# Patient Record
Sex: MALE | Race: WHITE | NOT HISPANIC OR LATINO | Employment: STUDENT | URBAN - METROPOLITAN AREA
[De-identification: names, ages, dates, MRNs, and addresses within clinical notes are randomized per-mention and may not be internally consistent; named-entity substitution may affect disease eponyms.]

---

## 2019-02-07 ENCOUNTER — APPOINTMENT (OUTPATIENT)
Dept: RADIOLOGY | Facility: IMAGING CENTER | Age: 29
End: 2019-02-07
Attending: PHYSICIAN ASSISTANT
Payer: COMMERCIAL

## 2019-02-07 ENCOUNTER — OFFICE VISIT (OUTPATIENT)
Dept: URGENT CARE | Facility: CLINIC | Age: 29
End: 2019-02-07
Payer: COMMERCIAL

## 2019-02-07 VITALS
WEIGHT: 222.4 LBS | HEIGHT: 76 IN | TEMPERATURE: 98 F | SYSTOLIC BLOOD PRESSURE: 112 MMHG | BODY MASS INDEX: 27.08 KG/M2 | DIASTOLIC BLOOD PRESSURE: 78 MMHG | HEART RATE: 96 BPM | RESPIRATION RATE: 16 BRPM | OXYGEN SATURATION: 97 %

## 2019-02-07 DIAGNOSIS — R07.89 CHEST WALL PAIN: ICD-10-CM

## 2019-02-07 DIAGNOSIS — J10.1 INFLUENZA A: ICD-10-CM

## 2019-02-07 DIAGNOSIS — Z87.01 HISTORY OF RECURRENT PNEUMONIA: ICD-10-CM

## 2019-02-07 DIAGNOSIS — R05.9 COUGH: ICD-10-CM

## 2019-02-07 LAB
FLUAV+FLUBV AG SPEC QL IA: POSITIVE
INT CON NEG: NORMAL
INT CON POS: NORMAL

## 2019-02-07 PROCEDURE — 71046 X-RAY EXAM CHEST 2 VIEWS: CPT | Mod: TC,FY | Performed by: PHYSICIAN ASSISTANT

## 2019-02-07 PROCEDURE — 87804 INFLUENZA ASSAY W/OPTIC: CPT | Performed by: PHYSICIAN ASSISTANT

## 2019-02-07 PROCEDURE — 99214 OFFICE O/P EST MOD 30 MIN: CPT | Performed by: PHYSICIAN ASSISTANT

## 2019-02-07 RX ORDER — BENZONATATE 100 MG/1
200 CAPSULE ORAL 3 TIMES DAILY PRN
Qty: 60 CAP | Refills: 0 | Status: SHIPPED | OUTPATIENT
Start: 2019-02-07 | End: 2020-09-14

## 2019-02-07 RX ORDER — OSELTAMIVIR PHOSPHATE 75 MG/1
75 CAPSULE ORAL 2 TIMES DAILY
Qty: 10 CAP | Refills: 0 | Status: SHIPPED | OUTPATIENT
Start: 2019-02-07 | End: 2019-02-12

## 2019-02-07 RX ORDER — DEXTROMETHORPHAN HYDROBROMIDE AND PROMETHAZINE HYDROCHLORIDE 15; 6.25 MG/5ML; MG/5ML
5 SYRUP ORAL 4 TIMES DAILY PRN
Qty: 100 ML | Refills: 0 | Status: SHIPPED | OUTPATIENT
Start: 2019-02-07 | End: 2019-02-12

## 2019-02-07 ASSESSMENT — ENCOUNTER SYMPTOMS
WHEEZING: 0
PALPITATIONS: 0
COUGH: 1
MYALGIAS: 1
CHILLS: 1
HEMOPTYSIS: 0
VOMITING: 0
SPUTUM PRODUCTION: 1
NAUSEA: 1
ABDOMINAL PAIN: 0
HEADACHES: 1
FEVER: 1
DIARRHEA: 1
SHORTNESS OF BREATH: 1

## 2019-02-07 NOTE — PROGRESS NOTES
Subjective:   Pantera Connelly is a 28 y.o. male who presents for Cough (wet, productive cough, chest congestion x 1 day)    This is a new problem.  Patient presents to urgent care with concern for possible pneumonia.  Yesterday he began to experience generalized body aches, perceived fever, chills, congestion, sore throat and cough.  Today, his symptoms have worsened and his cough has worsened as well.  Cough is productive of thick yellow-green mucous plugs.  He reports pain in the anterior chest with deep breathing and coughing.  He denies any palpitations or peripheral edema.  The patient is concerned about the possibility of pneumonia due to the fact that in the past 10 years he has had 4 documented cases of pneumonia.  The patient initially states that he does not have a history of asthma however after further thought he states that he was diagnosed with asthma at some point in his life but then apparently this diagnosis was rescinded.  Patient did not receive influenza vaccine this season.        Cough   Associated symptoms include chills, a fever, headaches, myalgias and shortness of breath. Pertinent negatives include no ear pain, hemoptysis, rash or wheezing.     Review of Systems   Constitutional: Positive for chills, fever and malaise/fatigue.   HENT: Positive for congestion. Negative for ear pain.    Respiratory: Positive for cough, sputum production and shortness of breath. Negative for hemoptysis and wheezing.    Cardiovascular: Negative for palpitations and leg swelling.        Chest wall pain   Gastrointestinal: Positive for diarrhea and nausea. Negative for abdominal pain and vomiting.   Musculoskeletal: Positive for myalgias.   Skin: Negative for rash.   Neurological: Positive for headaches.   All other systems reviewed and are negative.    Allergies   Allergen Reactions   • Other Drug      'ANTIBIOTIC,NAME UNKNOWN,CAUSES RASH'        Objective:   /78   Pulse 96   Temp 36.7 °C (98 °F)  "(Temporal)   Resp 16   Ht 1.93 m (6' 4\")   Wt 100.9 kg (222 lb 6.4 oz)   SpO2 97%   BMI 27.07 kg/m²   Physical Exam   Constitutional: He is oriented to person, place, and time. He appears well-developed and well-nourished.  Non-toxic appearance. He appears ill.   HENT:   Head: Normocephalic and atraumatic.   Right Ear: Tympanic membrane, external ear and ear canal normal.   Left Ear: Tympanic membrane, external ear and ear canal normal.   Nose: Mucosal edema present. No rhinorrhea. Right sinus exhibits no frontal sinus tenderness. Left sinus exhibits no maxillary sinus tenderness and no frontal sinus tenderness.   Mouth/Throat: Uvula is midline and mucous membranes are normal. No uvula swelling. Posterior oropharyngeal erythema present. No oropharyngeal exudate, posterior oropharyngeal edema or tonsillar abscesses. Tonsils are 2+ on the right. Tonsils are 2+ on the left. No tonsillar exudate.       Soft palate left side with small ulceration   Eyes: Pupils are equal, round, and reactive to light. Conjunctivae and EOM are normal.   Neck: Normal range of motion. Neck supple.   Cardiovascular: Normal rate, regular rhythm and normal heart sounds.  Exam reveals no friction rub.    No murmur heard.  Pulmonary/Chest: Effort normal. No respiratory distress. He has decreased breath sounds in the right middle field and the right lower field. He has no wheezes. He has rhonchi in the right middle field and the right lower field. He has no rales.   Abdominal: Soft. Bowel sounds are normal. There is no hepatosplenomegaly. There is no tenderness.   Musculoskeletal: Normal range of motion.   Lymphadenopathy:        Head (right side): No submental, no submandibular and no tonsillar adenopathy present.        Head (left side): No submental, no submandibular and no tonsillar adenopathy present.     He has no cervical adenopathy.        Right: No supraclavicular adenopathy present.        Left: No supraclavicular adenopathy " present.   Neurological: He is alert and oriented to person, place, and time. He has normal strength. No cranial nerve deficit or sensory deficit. Coordination normal.   Skin: Skin is warm and dry. No rash noted.   Psychiatric: He has a normal mood and affect. Judgment normal.           Assessment/Plan:   Assessment    1. Influenza A  - POCT Influenza A/B: + influenza A  - oseltamivir (TAMIFLU) 75 MG Cap; Take 1 Cap by mouth 2 times a day for 5 days.  Dispense: 10 Cap; Refill: 0      Patient is positive for influenza A.  He will be treated with Tamiflu.  He is encouraged to take this with food in order to avoid nausea.  For the cough he is given Tessalon Perles and promethazine DM for nighttime cough.  He is counseled on not driving while taking this medication.  Symptomatic, supportive care.  Increase fluids, rest.  Contagion reviewed.  Printed information with patient education on influenza given.  Recommend influenza vaccine in the future.    Given his past history of pneumonia and his concern chest x-ray is obtained.  Chest x-ray read by the radiologist and reviewed by myself is without evidence of pneumonia.  However, given his recurrent episodes of pneumonia the patient is requesting referral to pulmonology.  Referral was placed.    Red flag warning symptoms and strict ER precautions given.    2. Cough  - DX-CHEST-2 VIEWS; Future  - benzonatate (TESSALON) 100 MG Cap; Take 2 Caps by mouth 3 times a day as needed.  Dispense: 60 Cap; Refill: 0  - promethazine-dextromethorphan (PROMETHAZINE-DM) 6.25-15 MG/5ML syrup; Take 5 mL by mouth 4 times a day as needed for Cough for up to 5 days.  Dispense: 100 mL; Refill: 0    3. Chest wall pain  - DX-CHEST-2 VIEWS; Future    4. History of recurrent pneumonia  - REFERRAL TO PULMONOLOGY    Differential diagnosis, natural history, supportive care, and indications for immediate follow-up discussed.    If not improving in 3-5 days, F/U with PCP or return to  or sooner if  worsens    Please note that this note was created using voice recognition speech to text software. Every effort has been made to correct obvious errors.  However, I expect there are errors of grammar and possibly context that were not discovered prior to finalizing the note

## 2019-02-07 NOTE — LETTER
February 7, 2019         Patient: Pantera Connelly   YOB: 1990   Date of Visit: 2/7/2019           To Whom it May Concern:    Pantera Connelly was seen in my clinic on 2/7/2019. He may return to work on 2/11/19..    If you have any questions or concerns, please don't hesitate to call.        Sincerely,           Lizzie Schuler P.A.-C.  Electronically Signed

## 2020-09-14 ENCOUNTER — HOSPITAL ENCOUNTER (OUTPATIENT)
Facility: MEDICAL CENTER | Age: 30
End: 2020-09-14
Attending: PHYSICIAN ASSISTANT
Payer: COMMERCIAL

## 2020-09-14 ENCOUNTER — OFFICE VISIT (OUTPATIENT)
Dept: URGENT CARE | Facility: CLINIC | Age: 30
End: 2020-09-14
Payer: COMMERCIAL

## 2020-09-14 ENCOUNTER — APPOINTMENT (OUTPATIENT)
Dept: RADIOLOGY | Facility: IMAGING CENTER | Age: 30
End: 2020-09-14
Attending: PHYSICIAN ASSISTANT
Payer: COMMERCIAL

## 2020-09-14 VITALS
SYSTOLIC BLOOD PRESSURE: 118 MMHG | WEIGHT: 235 LBS | TEMPERATURE: 98.2 F | BODY MASS INDEX: 28.62 KG/M2 | HEART RATE: 77 BPM | OXYGEN SATURATION: 100 % | HEIGHT: 76 IN | DIASTOLIC BLOOD PRESSURE: 80 MMHG | RESPIRATION RATE: 20 BRPM

## 2020-09-14 DIAGNOSIS — R06.02 SOB (SHORTNESS OF BREATH): ICD-10-CM

## 2020-09-14 DIAGNOSIS — J45.21 MILD INTERMITTENT ASTHMA WITH ACUTE EXACERBATION: Primary | ICD-10-CM

## 2020-09-14 DIAGNOSIS — Z20.822 CLOSE EXPOSURE TO COVID-19 VIRUS: ICD-10-CM

## 2020-09-14 LAB — COVID ORDER STATUS COVID19: NORMAL

## 2020-09-14 PROCEDURE — 99214 OFFICE O/P EST MOD 30 MIN: CPT | Mod: CR,CS | Performed by: PHYSICIAN ASSISTANT

## 2020-09-14 PROCEDURE — 71046 X-RAY EXAM CHEST 2 VIEWS: CPT | Mod: TC | Performed by: PHYSICIAN ASSISTANT

## 2020-09-14 PROCEDURE — U0003 INFECTIOUS AGENT DETECTION BY NUCLEIC ACID (DNA OR RNA); SEVERE ACUTE RESPIRATORY SYNDROME CORONAVIRUS 2 (SARS-COV-2) (CORONAVIRUS DISEASE [COVID-19]), AMPLIFIED PROBE TECHNIQUE, MAKING USE OF HIGH THROUGHPUT TECHNOLOGIES AS DESCRIBED BY CMS-2020-01-R: HCPCS

## 2020-09-14 RX ORDER — ALBUTEROL SULFATE 90 UG/1
2 AEROSOL, METERED RESPIRATORY (INHALATION) EVERY 6 HOURS PRN
Qty: 8.5 G | Refills: 0 | Status: SHIPPED | OUTPATIENT
Start: 2020-09-14 | End: 2023-03-23

## 2020-09-14 ASSESSMENT — ENCOUNTER SYMPTOMS
SHORTNESS OF BREATH: 1
ABDOMINAL PAIN: 0
FEVER: 0
PALPITATIONS: 0
SPUTUM PRODUCTION: 0
SORE THROAT: 0
WHEEZING: 1
COUGH: 1
CONSTIPATION: 0
DIARRHEA: 0
CHILLS: 0
MYALGIAS: 0
VOMITING: 0
NAUSEA: 0
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
## 2020-09-14 NOTE — PATIENT INSTRUCTIONS
COVID-19 results pending    *Return home and continue self-isolation until you get your test result back.  If positive: You will be called by [facility] staff.    · Stay home and continue self isolation until:  · At least ten (10) days have passed since symptoms first appeared AND  · At least 24 hours have passed from last fever without the use of fever-reducing medications AND  · Symptoms have improved (eg: cough or shortness of breath)    If negative: You will be getting a [employer] message or a letter from [facility].  · Stay home until you have no fever for 24 hours and your symptoms (cough and shortness of breath) have resolved.    You may call [facility] ER Discharge Culture Line at [phone] for results/questions.    *Even after the above are met, maintain social distance from others (at least 6 feet) and practice frequent hand washing.    *Wear a face mask in public places.

## 2020-09-15 LAB
SARS-COV-2 RNA RESP QL NAA+PROBE: NOTDETECTED
SPECIMEN SOURCE: NORMAL

## 2020-12-04 ENCOUNTER — HOSPITAL ENCOUNTER (OUTPATIENT)
Facility: MEDICAL CENTER | Age: 30
End: 2020-12-04
Attending: NURSE PRACTITIONER
Payer: COMMERCIAL

## 2020-12-04 ENCOUNTER — OFFICE VISIT (OUTPATIENT)
Dept: URGENT CARE | Facility: PHYSICIAN GROUP | Age: 30
End: 2020-12-04
Payer: COMMERCIAL

## 2020-12-04 VITALS
DIASTOLIC BLOOD PRESSURE: 68 MMHG | OXYGEN SATURATION: 95 % | SYSTOLIC BLOOD PRESSURE: 130 MMHG | TEMPERATURE: 98.2 F | HEART RATE: 78 BPM | BODY MASS INDEX: 29.22 KG/M2 | RESPIRATION RATE: 16 BRPM | HEIGHT: 76 IN | WEIGHT: 240 LBS

## 2020-12-04 DIAGNOSIS — J45.21 MILD INTERMITTENT ASTHMA WITH EXACERBATION: ICD-10-CM

## 2020-12-04 DIAGNOSIS — Z20.822 SUSPECTED COVID-19 VIRUS INFECTION: ICD-10-CM

## 2020-12-04 DIAGNOSIS — Z00.00 HEALTH CARE MAINTENANCE: ICD-10-CM

## 2020-12-04 DIAGNOSIS — Z23 FLU VACCINE NEED: ICD-10-CM

## 2020-12-04 DIAGNOSIS — R05.9 COUGH: ICD-10-CM

## 2020-12-04 PROCEDURE — 99214 OFFICE O/P EST MOD 30 MIN: CPT | Mod: 25,CS | Performed by: NURSE PRACTITIONER

## 2020-12-04 PROCEDURE — 90471 IMMUNIZATION ADMIN: CPT | Performed by: NURSE PRACTITIONER

## 2020-12-04 PROCEDURE — 90686 IIV4 VACC NO PRSV 0.5 ML IM: CPT | Performed by: NURSE PRACTITIONER

## 2020-12-04 PROCEDURE — U0003 INFECTIOUS AGENT DETECTION BY NUCLEIC ACID (DNA OR RNA); SEVERE ACUTE RESPIRATORY SYNDROME CORONAVIRUS 2 (SARS-COV-2) (CORONAVIRUS DISEASE [COVID-19]), AMPLIFIED PROBE TECHNIQUE, MAKING USE OF HIGH THROUGHPUT TECHNOLOGIES AS DESCRIBED BY CMS-2020-01-R: HCPCS

## 2020-12-04 RX ORDER — ALBUTEROL SULFATE 90 UG/1
2 AEROSOL, METERED RESPIRATORY (INHALATION) EVERY 4 HOURS PRN
Qty: 1 EACH | Refills: 0 | Status: SHIPPED | OUTPATIENT
Start: 2020-12-04 | End: 2020-12-18

## 2020-12-04 ASSESSMENT — ENCOUNTER SYMPTOMS
ABDOMINAL PAIN: 0
HEMOPTYSIS: 0
NAUSEA: 0
MYALGIAS: 0
WHEEZING: 0
CHILLS: 0
PALPITATIONS: 0
VOMITING: 0
HEADACHES: 0
SPUTUM PRODUCTION: 0
COUGH: 1
DIARRHEA: 0
SHORTNESS OF BREATH: 0
FATIGUE: 0
FEVER: 0
SORE THROAT: 0

## 2020-12-04 NOTE — PROGRESS NOTES
Subjective:     Pantera Connelly is a 30 y.o. male who presents for Other (covid exposure; )      Other  This is a new problem. 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 He states that he is the common link between these people and is concerned he is asymptomatic positive that is spreading the virus around. ). The problem has been unchanged. Associated symptoms include coughing. Pertinent negatives include no abdominal pain, chest pain, chills, congestion, fatigue, fever, headaches, myalgias, nausea, rash, sore throat or vomiting. Associated symptoms comments: Dry cough for the past 6 months. Nothing aggravates the symptoms. Treatments tried: albuterol.         Review of Systems   Constitutional: Negative for chills, fatigue, fever and malaise/fatigue.   HENT: Negative for congestion, ear pain and sore throat.    Respiratory: Positive for cough. Negative for hemoptysis, sputum production, shortness of breath and wheezing.    Cardiovascular: Negative for chest pain and palpitations.   Gastrointestinal: Negative for abdominal pain, diarrhea, nausea and vomiting.   Musculoskeletal: Negative for myalgias.   Skin: Negative for rash.   Neurological: Negative for headaches.   All other systems reviewed and are negative.      PMH:   Past Medical History:   Diagnosis Date   • Heart burn    • Indigestion    • Pneumonia     3 years ago,approx 2009     ALLERGIES:   Allergies   Allergen Reactions   • Other Drug      'ANTIBIOTIC,NAME UNKNOWN,CAUSES RASH'     SURGHX:   Past Surgical History:   Procedure Laterality Date   • SEPTOPLASTY  8/13/2012    Performed by IJEOMA SMART at SURGERY SAME DAY [facility] ORS   • TURBINOPLASTY  8/13/2012    Performed by IJEOMA SMART at SURGERY SAME DAY [facility] ORS   • NASAL FRACTURE REDUCTION OPEN  8/13/2012    Performed by SHAJI POSADAS at SURGERY SAME DAY [facility]  "ORS   • OTHER      WISDOM TEETH REMOVED     SOCHX:   Social History     Socioeconomic History   • Marital status: Single     Spouse name: Not on file   • Number of children: Not on file   • Years of education: Not on file   • Highest education level: Not on file   Occupational History   • Not on file   Social Needs   • Financial resource strain: Not on file   • Food insecurity     Worry: Not on file     Inability: Not on file   • Transportation needs     Medical: Not on file     Non-medical: Not on file   Tobacco Use   • Smoking status: Never Smoker   • Smokeless tobacco: Never Used   Substance and Sexual Activity   • Alcohol use: No   • Drug use: No   • Sexual activity: Not on file   Lifestyle   • Physical activity     Days per week: Not on file     Minutes per session: Not on file   • Stress: Not on file   Relationships   • Social connections     Talks on phone: Not on file     Gets together: Not on file     Attends Congregational service: Not on file     Active member of club or organization: Not on file     Attends meetings of clubs or organizations: Not on file     Relationship status: Not on file   • Intimate partner violence     Fear of current or ex partner: Not on file     Emotionally abused: Not on file     Physically abused: Not on file     Forced sexual activity: Not on file   Other Topics Concern   • Not on file   Social History Narrative   • Not on file     FH:   Family History   Problem Relation Age of Onset   • Diabetes Mother    • Hypertension Mother    • Diabetes Father    • Hypertension Father    • Lung Disease Sister          Objective:   /68 (BP Location: Left arm, Patient Position: Sitting, BP Cuff Size: Adult)   Pulse 78   Temp 36.8 °C (98.2 °F) (Temporal)   Resp 16   Ht 1.93 m (6' 4\") Comment: per pt  Wt 108.9 kg (240 lb) Comment: per pt  SpO2 95%   BMI 29.21 kg/m²     Physical Exam  Vitals signs and nursing note reviewed.   Constitutional:       General: He is not in acute " distress.     Appearance: Normal appearance. He is normal weight. He is not ill-appearing.   HENT:      Head: Normocephalic and atraumatic.      Right Ear: External ear normal.      Left Ear: External ear normal.      Nose: No congestion or rhinorrhea.      Mouth/Throat:      Mouth: Mucous membranes are moist.   Eyes:      Extraocular Movements: Extraocular movements intact.      Pupils: Pupils are equal, round, and reactive to light.   Neck:      Musculoskeletal: Normal range of motion and neck supple.   Cardiovascular:      Rate and Rhythm: Normal rate and regular rhythm.      Pulses: Normal pulses.      Heart sounds: Normal heart sounds.   Pulmonary:      Effort: Pulmonary effort is normal. No respiratory distress.      Breath sounds: Normal breath sounds. No stridor. No wheezing, rhonchi or rales.   Chest:      Chest wall: No tenderness.   Abdominal:      General: Abdomen is flat. Bowel sounds are normal.      Palpations: Abdomen is soft.      Tenderness: There is no abdominal tenderness. There is no right CVA tenderness or left CVA tenderness.   Musculoskeletal: Normal range of motion.   Skin:     General: Skin is warm and dry.      Capillary Refill: Capillary refill takes less than 2 seconds.   Neurological:      General: No focal deficit present.      Mental Status: He is alert and oriented to person, place, and time. Mental status is at baseline.   Psychiatric:         Mood and Affect: Mood normal.         Behavior: Behavior normal.         Thought Content: Thought content normal.         Judgment: Judgment normal.         Assessment/Plan:   Assessment    1. Health care maintenance  REFERRAL TO FOLLOW-UP WITH PRIMARY CARE   2. Suspected COVID-19 virus infection  COVID/SARS COV-2 PCR   3. Cough  albuterol 108 (90 Base) MCG/ACT Aero Soln inhalation aerosol   4. Mild intermittent asthma with exacerbation  albuterol 108 (90 Base) MCG/ACT Aero Soln inhalation aerosol   5. Flu vaccine need  Influenza Vaccine Quad  Injection (PF)     Despite his lack of  Covid symptoms, he will be tested as multiple people around him have tested positive after his exposure.  His cough is likely related refill for albuterol sent in per request.  Patient to follow-up with primary care for further healthcare needs.  Influenza vaccine given in office today.  He tolerated this well.  AVS handout given and reviewed with patient. Pt educated on red flags and when to seek treatment back in ER or UC.

## 2020-12-05 LAB
COVID ORDER STATUS COVID19: NORMAL
SARS-COV-2 RNA RESP QL NAA+PROBE: NOTDETECTED
SPECIMEN SOURCE: NORMAL

## 2020-12-22 ENCOUNTER — OFFICE VISIT (OUTPATIENT)
Dept: URGENT CARE | Facility: CLINIC | Age: 30
End: 2020-12-22
Payer: COMMERCIAL

## 2020-12-22 ENCOUNTER — HOSPITAL ENCOUNTER (OUTPATIENT)
Facility: MEDICAL CENTER | Age: 30
End: 2020-12-22
Attending: PHYSICIAN ASSISTANT
Payer: COMMERCIAL

## 2020-12-22 VITALS
RESPIRATION RATE: 16 BRPM | HEIGHT: 76 IN | HEART RATE: 82 BPM | DIASTOLIC BLOOD PRESSURE: 70 MMHG | TEMPERATURE: 98.5 F | WEIGHT: 230 LBS | BODY MASS INDEX: 28.01 KG/M2 | OXYGEN SATURATION: 95 % | SYSTOLIC BLOOD PRESSURE: 112 MMHG

## 2020-12-22 DIAGNOSIS — J02.9 SORE THROAT: ICD-10-CM

## 2020-12-22 DIAGNOSIS — J06.9 UPPER RESPIRATORY TRACT INFECTION, UNSPECIFIED TYPE: ICD-10-CM

## 2020-12-22 DIAGNOSIS — Z20.822 EXPOSURE TO COVID-19 VIRUS: ICD-10-CM

## 2020-12-22 LAB
INT CON NEG: NEGATIVE
INT CON POS: POSITIVE
S PYO AG THROAT QL: NEGATIVE

## 2020-12-22 PROCEDURE — 87880 STREP A ASSAY W/OPTIC: CPT | Performed by: PHYSICIAN ASSISTANT

## 2020-12-22 PROCEDURE — U0003 INFECTIOUS AGENT DETECTION BY NUCLEIC ACID (DNA OR RNA); SEVERE ACUTE RESPIRATORY SYNDROME CORONAVIRUS 2 (SARS-COV-2) (CORONAVIRUS DISEASE [COVID-19]), AMPLIFIED PROBE TECHNIQUE, MAKING USE OF HIGH THROUGHPUT TECHNOLOGIES AS DESCRIBED BY CMS-2020-01-R: HCPCS

## 2020-12-22 PROCEDURE — 99214 OFFICE O/P EST MOD 30 MIN: CPT | Mod: CS | Performed by: PHYSICIAN ASSISTANT

## 2020-12-22 RX ORDER — BENZONATATE 100 MG/1
200 CAPSULE ORAL 3 TIMES DAILY PRN
Qty: 30 CAP | Refills: 0 | Status: SHIPPED | OUTPATIENT
Start: 2020-12-22 | End: 2022-08-20

## 2020-12-22 RX ORDER — ACETAMINOPHEN 500 MG
500-1000 TABLET ORAL EVERY 6 HOURS PRN
Status: ON HOLD | COMMUNITY
End: 2023-03-25

## 2020-12-22 ASSESSMENT — ENCOUNTER SYMPTOMS
FATIGUE: 1
VOMITING: 0
EYE DISCHARGE: 0
MYALGIAS: 1
HEADACHES: 1
NECK PAIN: 0
SPUTUM PRODUCTION: 0
ABDOMINAL PAIN: 0
SORE THROAT: 1
DIZZINESS: 0
CHILLS: 1
DIARRHEA: 1
EYE REDNESS: 0
WHEEZING: 0
COUGH: 1

## 2020-12-23 DIAGNOSIS — Z20.822 EXPOSURE TO COVID-19 VIRUS: ICD-10-CM

## 2020-12-23 DIAGNOSIS — J06.9 UPPER RESPIRATORY TRACT INFECTION, UNSPECIFIED TYPE: ICD-10-CM

## 2020-12-23 NOTE — PROGRESS NOTES
"Subjective:      Pantera Connelly is a 30 y.o. male who presents with Fatigue (chills, dizzyness, sore throat, cough, body aches, headache, jaw pain, Covid exposure x 2 days)            Patient is a 30-year-old male who presents to urgent care with fatigue, body aches, sore throat, cough and noted headache for the last 2 days.  Patient reports 5 days ago was when he last worked with a coworker and the following day she tested positive for Covid.  He does report slight chest tightness when walking upstairs otherwise denies any shortness of breath, wheezing.  He also denies other ill contacts.  He has been taking Tylenol with relief for the body aches.    Fatigue  This is a new problem. The current episode started in the past 7 days. The problem occurs constantly. The problem has been waxing and waning. Associated symptoms include chills, congestion, coughing, fatigue, headaches, myalgias and a sore throat. Pertinent negatives include no abdominal pain, chest pain, neck pain, rash or vomiting.       Review of Systems   Constitutional: Positive for chills, fatigue and malaise/fatigue.   HENT: Positive for congestion and sore throat. Negative for ear discharge and ear pain.    Eyes: Negative for discharge and redness.   Respiratory: Positive for cough. Negative for sputum production and wheezing.    Cardiovascular: Negative for chest pain and leg swelling.   Gastrointestinal: Positive for diarrhea. Negative for abdominal pain and vomiting.   Genitourinary: Negative for dysuria and urgency.   Musculoskeletal: Positive for myalgias. Negative for neck pain.   Skin: Negative for itching and rash.   Neurological: Positive for headaches. Negative for dizziness.          Objective:     /70 (BP Location: Left arm, Patient Position: Sitting, BP Cuff Size: Adult long)   Pulse 82   Temp 36.9 °C (98.5 °F) (Temporal)   Resp 16   Ht 1.93 m (6' 4\")   Wt 104.3 kg (230 lb)   SpO2 95%   BMI 28.00 kg/m²    PMH:  has a " past medical history of Heart burn, Indigestion, and Pneumonia.  MEDS: Reviewed .   ALLERGIES:   Allergies   Allergen Reactions   • Other Drug      'ANTIBIOTIC,NAME UNKNOWN,CAUSES RASH'     SURGHX:   Past Surgical History:   Procedure Laterality Date   • SEPTOPLASTY  8/13/2012    Performed by IJEOMA SMART at SURGERY SAME DAY [facility] ORS   • TURBINOPLASTY  8/13/2012    Performed by IJEOMA SMART at SURGERY SAME DAY [facility] ORS   • NASAL FRACTURE REDUCTION OPEN  8/13/2012    Performed by SHAJI POSADAS at SURGERY SAME DAY [facility] ORS   • OTHER      WISDOM TEETH REMOVED     SOCHX:  reports that he has never smoked. He has never used smokeless tobacco. He reports previous alcohol use. He reports that he does not use drugs.  FH: Family history was reviewed, no pertinent findings to report    Physical Exam  Vitals signs reviewed.   Constitutional:       General: He is not in acute distress.     Appearance: He is well-developed.   HENT:      Head: Normocephalic and atraumatic.      Right Ear: External ear normal.      Left Ear: External ear normal.      Mouth/Throat:      Pharynx: Posterior oropharyngeal erythema present. No oropharyngeal exudate.      Comments: Left tonsillar pillar with noted ulceration diffuse erythema  Eyes:      Conjunctiva/sclera: Conjunctivae normal.      Pupils: Pupils are equal, round, and reactive to light.   Neck:      Musculoskeletal: Normal range of motion and neck supple.      Trachea: No tracheal deviation.   Cardiovascular:      Rate and Rhythm: Normal rate and regular rhythm.      Heart sounds: No murmur.   Pulmonary:      Effort: Pulmonary effort is normal. No respiratory distress.      Breath sounds: Normal breath sounds.   Musculoskeletal: Normal range of motion.   Lymphadenopathy:      Cervical: Cervical adenopathy present.   Skin:     General: Skin is warm.      Findings: No rash.   Neurological:      Mental Status: He is alert and oriented to person, place, and  time.      Coordination: Coordination normal.   Psychiatric:         Behavior: Behavior normal.         Thought Content: Thought content normal.         Judgment: Judgment normal.                 Assessment/Plan:        1. Exposure to COVID-19 virus  - COVID/SARS COV-2 PCR; Future  - POCT Rapid Strep A    2. Upper respiratory tract infection, unspecified type  - COVID/SARS COV-2 PCR; Future  - benzonatate (TESSALON) 100 MG Cap; Take 2 Caps by mouth 3 times a day as needed for Cough.  Dispense: 30 Cap; Refill: 0    3. Sore throat  - POCT Rapid Strep A      Strep negative.     Appropriate PPE worn at all times by provider.   Pt. Had face mask on throughout entirety of the visit other than oropharyngeal examination today.     Testing performed for COVID-19.    Patient currently without indication of need for higher level of care.   Patient/guardian instructed to review handout for MyChart and instructions regarding self-isolation/next steps/follow-up.       Reviewed with patient/guardian that if they do test positive they will be contacted by their local health department regarding return to work/school protocols.  Results will also be released to patient/guardian in MyChart or called to the patient/guardian directly.  Encouraged mask use, frequent handwashing, wiping down hard surfaces, etc.    Patient and/or guardian given precautionary s/sx that mandate immediate follow up and evaluation in the ED. Advised of risks of not doing so.  Side effects of OTC or prescribed medications discussed.   DDX, Supportive care, and indications for immediate follow-up discussed with patient.    Instructed to return to clinic or nearest emergency department if we are not available for any change in condition, further concerns, or worsening of symptoms.    The patient and/or guardian demonstrated a good understanding and agreed with the treatment plan.    Please note that this dictation was created using voice recognition software. I  have made every reasonable attempt to correct obvious errors, but I expect that there are errors of grammar and possibly content that I did not discover before finalizing the note.

## 2021-02-01 ENCOUNTER — OFFICE VISIT (OUTPATIENT)
Dept: URGENT CARE | Facility: CLINIC | Age: 31
End: 2021-02-01
Payer: COMMERCIAL

## 2021-02-01 ENCOUNTER — APPOINTMENT (OUTPATIENT)
Dept: RADIOLOGY | Facility: IMAGING CENTER | Age: 31
End: 2021-02-01
Attending: NURSE PRACTITIONER
Payer: COMMERCIAL

## 2021-02-01 VITALS
HEIGHT: 76 IN | OXYGEN SATURATION: 97 % | TEMPERATURE: 98.4 F | SYSTOLIC BLOOD PRESSURE: 132 MMHG | DIASTOLIC BLOOD PRESSURE: 80 MMHG | HEART RATE: 74 BPM | WEIGHT: 243 LBS | BODY MASS INDEX: 29.59 KG/M2
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
## 2021-02-01 DIAGNOSIS — M79.18 BUTTOCK PAIN: ICD-10-CM

## 2021-02-01 DIAGNOSIS — S32.10XA CLOSED FRACTURE OF SACRUM, UNSPECIFIED PORTION OF SACRUM, INITIAL ENCOUNTER (HCC): ICD-10-CM

## 2021-02-01 PROCEDURE — 72170 X-RAY EXAM OF PELVIS: CPT | Mod: TC,FY | Performed by: NURSE PRACTITIONER

## 2021-02-01 PROCEDURE — 72220 X-RAY EXAM SACRUM TAILBONE: CPT | Mod: TC,FY | Performed by: NURSE PRACTITIONER

## 2021-02-01 PROCEDURE — 99214 OFFICE O/P EST MOD 30 MIN: CPT | Performed by: NURSE PRACTITIONER

## 2021-02-01 ASSESSMENT — VISUAL ACUITY: OU: 1

## 2021-02-01 ASSESSMENT — ENCOUNTER SYMPTOMS
FOCAL WEAKNESS: 0
GASTROINTESTINAL NEGATIVE: 1
CONSTITUTIONAL NEGATIVE: 1

## 2021-02-01 NOTE — PROGRESS NOTES
Subjective:     Pantera Connelly is a 30 y.o. male who presents for Fall (experiencing coccyx pain x3 wks )       Fall  The point of impact was the buttocks.     Patient reports that 3 weeks ago he had a fall from a snowboard.  He fell onto his bottom onto some rocks when the snow cover was thin.  Reports pain and tenderness around his sacrum.  Pain is worsening and feels like it is now hurting more towards the right side.  Is able to ambulate.  Denies bowel/bladder incontinence.  Reports the right buttock seems to feel numb.  Otherwise, denies other sensory changes.  Denies weakness.  Has taken ibuprofen, acetaminophen, and has applied ice.    Patient was screened prior to rooming and denied COVID-19 diagnosis or contact with a person who has been diagnosed or is suspected to have COVID-19. During this visit, appropriate PPE was worn, hand hygiene was performed, and the patient and any visitors were masked.     PMH:  has a past medical history of Heart burn, Indigestion, and Pneumonia.    MEDS:   Current Outpatient Medications:   •  acetaminophen (TYLENOL) 500 MG Tab, Take 500-1,000 mg by mouth every 6 hours as needed., Disp: , Rfl:   •  Pseudoeph-Doxylamine-DM-APAP (DAYQUIL/NYQUIL COLD/FLU RELIEF PO), Take  by mouth., Disp: , Rfl:   •  benzonatate (TESSALON) 100 MG Cap, Take 2 Caps by mouth 3 times a day as needed for Cough. (Patient not taking: Reported on 2/1/2021), Disp: 30 Cap, Rfl: 0  •  albuterol 108 (90 Base) MCG/ACT Aero Soln inhalation aerosol, Inhale 2 Puffs by mouth every 6 hours as needed for Shortness of Breath. (Patient not taking: Reported on 2/1/2021), Disp: 8.5 g, Rfl: 0  •  Ibuprofen (ADVIL PO), Take  by mouth., Disp: , Rfl:     ALLERGIES:   Allergies   Allergen Reactions   • Other Drug      'ANTIBIOTIC,NAME UNKNOWN,CAUSES RASH'     SURGHX:   Past Surgical History:   Procedure Laterality Date   • SEPTOPLASTY  8/13/2012    Performed by IJEOMA SMART at SURGERY SAME DAY [facility] ORS   •  "TURBINOPLASTY  8/13/2012    Performed by IJEOMA SMART at SURGERY SAME DAY [facility]   • NASAL FRACTURE REDUCTION OPEN  8/13/2012    Performed by SHAJI POSADAS at SURGERY SAME DAY [facility]S   • OTHER      WISDOM TEETH REMOVED     SOCHX:  reports that he has never smoked. He has never used smokeless tobacco. He reports previous alcohol use. He reports that he does not use drugs.     FH: Reviewed with patient, not pertinent to this visit.    Review of Systems   Constitutional: Negative.    Gastrointestinal: Negative.    Genitourinary: Negative.    Musculoskeletal:        Sacral pain   Skin: Negative.  Negative for rash.   Neurological: Negative for focal weakness.   All other systems reviewed and are negative.    Additional details per HPI.      Objective:     /80 (BP Location: Left arm, Patient Position: Sitting, BP Cuff Size: Adult)   Pulse 74   Temp 36.9 °C (98.4 °F) (Temporal)   Ht 1.93 m (6' 4\")   Wt 110 kg (243 lb)   SpO2 97%   BMI 29.58 kg/m²     Physical Exam  Vitals signs reviewed.   Constitutional:       General: He is not in acute distress.     Appearance: He is well-developed. He is not ill-appearing or toxic-appearing.   HENT:      Head: Normocephalic.      Right Ear: External ear normal.      Left Ear: External ear normal.      Nose: Nose normal.   Eyes:      General: Vision grossly intact.      Extraocular Movements: Extraocular movements intact.      Conjunctiva/sclera: Conjunctivae normal.   Neck:      Musculoskeletal: Normal range of motion.   Cardiovascular:      Rate and Rhythm: Normal rate.   Pulmonary:      Effort: Pulmonary effort is normal. No respiratory distress.   Musculoskeletal: Normal range of motion.         General: No deformity.      Comments: Pain, tenderness over sacral region, no swelling or deformity   Skin:     General: Skin is warm and dry.      Coloration: Skin is not pale.   Neurological:      Mental Status: He is alert and oriented to person, " place, and time.      Sensory: No sensory deficit.      Motor: No weakness.      Coordination: Coordination normal.      Gait: Gait normal.   Psychiatric:         Behavior: Behavior normal. Behavior is cooperative.     X-ray of pelvis:    Details    Reading Physician Reading Date Result Priority   Arvin Westbrook M.D.  [phone] [facility]/2021 [facility]      Narrative & Impression        2/1/2021 2:28 PM     HISTORY/REASON FOR EXAM:  Pelvic/Hip Pain Following Trauma    TECHNIQUE/EXAM DESCRIPTION AND NUMBER OF VIEWS:  1 view(s) of the pelvis.     COMPARISON:  None.     FINDINGS:     Limited evaluation of the sacrum and bilateral iliac crests due to overlying bowel content.     No displaced fracture or dislocation.     Mild osteoarthritis of bilateral hip joints.     Unremarkable bilateral SI joints.     Unremarkable pubic symphysis.    IMPRESSION:    Mild osteoarthritis of bilateral hip joints             Last Resulted: 02/01/21  2:50 PM        X-ray of sacrum/coccyx:    Details    Reading Physician Reading Date Result Priority   Arvin Westbrook M.D.  [phone] 2/1/2021 Urgent Care      Narrative & Impression        2/1/2021 2:28 PM     HISTORY/REASON FOR EXAM:  Pain Following Trauma.  Coccyx pain.     TECHNIQUE/EXAM DESCRIPTION AND NUMBER OF VIEWS:  3 views of the sacrum and coccyx.     COMPARISON: None.     FINDINGS:  Ill-defined lucency in the distal sacrum could relate to nondisplaced subacute fracture.     IMPRESSION:     Ill-defined lucency in the distal sacrum could relate to nondisplaced subacute fracture.             Last Resulted: 02/01/21  2:51 PM        Radiology report and images reviewed by myself. Concur with findings.      Assessment/Plan:     1. Fall involving snowboard as cause of accidental injury  - DX-PELVIS-1 OR 2 VIEWS; Future  - DX-SACRUM AND COCCYX 2+; Future  - REFERRAL TO ORTHOPEDICS    2. Buttock pain  - DX-PELVIS-1 OR 2 VIEWS; Future  - DX-SACRUM AND COCCYX 2+; Future    3. Closed  fracture of sacrum, unspecified portion of sacrum, initial encounter (HCC)  - REFERRAL TO ORTHOPEDICS    Rest, ice, compression, and elevation (RICE) and over-the-counter ibuprofen and/or acetaminophen, per 's instructions, as needed for pain.    Referral placed for orthopedics evaluation and treatment.    Differential diagnosis, natural history, supportive care, over-the-counter symptom management per 's instructions, close monitoring, and indications for immediate follow-up discussed.     Patient advised to: Return for 1) Symptoms that worsen/don't improve, or go to ER, 2) Follow up with primary care in 7-10 days.    All questions answered. Patient agrees with the plan of care.    Discharge summary provided.

## 2021-02-01 NOTE — PATIENT INSTRUCTIONS
A referral request has been placed for orthopedics. We have a referrals department that is tasked with locating a suitable office that currently accepts patients and your insurance and you should be receiving referral information from them such as the office name, address, and number. This process usually takes around 3 business days. If you are not contacted by our referrals department, please call [phone].     Fracture Care, Generic  The 206 bones in our body are important for supporting our body (skeleton) and also for production of blood cells by the bone marrow. A fracture is a break in a tissue. A tissue is a collection of cells that performs a function or job in our body. We most commonly think of fractures in bones.  When a bone is broken, or fractured, it affects not only blood production and function. There may also be other damage when structures near the bones are injured.  There are three main types of fractures:  · Open - where there is a wound leading to the fracture site or the bone is protruding from the skin.   · Closed - where the bone has fractured but has no external wound.   · Complicated - this may involve damage to associated vital organs and major blood vessels as a result of the fracture.   Fractures are usually managed by keeping the bones in place long enough for them to heal. This is usually done with splints or casts. Sometimes surgery is required and pins, plates and screws may be used to hold fractures in proper position. The amount of time it takes a fracture to heal depends mostly on the age and health of the patient.  Young children are prone to fractures. These fractures heal rather quickly. The common fractures suffered by children tend to be associated with the arms and wrists. As young bones do not harden for some years, children's fractures tend to 'bend and splinter', similar to a broken branch on a tree. This the reason for the name, 'greenstick fracture'.  As we grow  older, there may be a loss of bone known as osteopenia or osteoporosis. These conditions make breaking a bone much easier. Sometimes a minor accident or simply over-use may produce a fracture. These fractures do not heal as fast a younger person's.  SYMPTOMS  The signs and symptoms of fractures of bones depend on how bad the injury is. If shock is present, there may be pale, cool, clammy skin with a rapid, weak pulse. There is usually pain and tenderness in the area of the fracture. There may or may not be deformity of the bone. There may be injury to surrounding tissues.  TREATMENT  · Care and treatment of fractures relies on immobilization and adequate splinting of the injury. If the fracture is complex, the wound associated with an open fracture may be difficult to handle without professional help.   · If the pulse to the end part of the limb (distal pulse) cannot be restored by gentle traction, then the limb should be stabilized in its current position. Urgent ambulance transport should be obtained. Do not waste time with splinting.   · Generally, fractured limbs should be made immobile and left for medical aid. In remote areas or some distance from medical aid, you may be required to treat as follows.   FRACTURED FOREARM  · Check for pulse at the end part of the limb. If none - gentle traction until pulse returns   · Treat any wounds   · Pad bony prominences   · Apply adequate splinting.   · Secure above and below fracture, secure wrist.   · Reassess pulse or return of color and/or warmth.   · Elevate injury with arm sling.    FRACTURED UPPER ARM  · Check for pulse at the end part of the limb, if none - gentle traction until pulse returns.   · Treat any wounds.   · Pad between arm and chest.   · Apply 'collar and cuff' sling, secure above and below fracture firmly against chest with triangular bandages.   · Reassess pulse or return of color and/or warmth.    FRACTURED LEG  · Check for circulation and pulse at  the end part of the limb (skin color and temperature). If no circulation, apply gentle traction until pulse or color returns.   · Call '911' for an ambulance.   · Treat any wounds.   · Immobilize (keep it from moving) the limb.   · Pad bony prominences.   · Reassess circulation below injury.   FRACTURED PELVIS  · Call '911' for an ambulance.   · Check for pulses in both legs.   · Bend legs at knees, elevate lower legs slightly and support on pillows or something padded.   · Support both hips with folded blankets either side.   · Discourage attempts to urinate.   · Care must be exercised with a suspected fractured pelvis. This injury may have serious complications. The casualty should always be transported by ambulance and not by alternative means unless absolutely necessary.   FRACTURED JAW  · A common injury in certain contact sports is dislocation, or fracture of the lower jaw (mandible). The casualty will have pain in the jaw, be unable to speak properly, and may have trouble swallowing.   · Call '911' for an ambulance.   · Support the jaw.   · Sit the injured person leaning slightly forward.   · Rest the injured jaw on a pad held by the injured person.   · DO NOT apply a bandage to support the jaw.   · Observe the casualty carefully for signs of breathing difficulties and any indication that he or she is becoming drowsy or unconscious.   SLINGS  · Slings are used to support an injured arm, or to supplement treatment for another injury such as fractured ribs. Generally, the most effective sling is made with a triangular bandage. Every first aid kit, no matter how small, should have at least one of these bandages.   · Although triangular bandages are preferable, any material (tie, belt, or piece of thick twine or rope) can be used in an emergency. If no likely material is at hand, an injured arm can be adequately supported by inserting it inside the injured person's shirt or blouse. Similarly, a safety pin applied  to a sleeve and secured to clothing on the chest may work well enough.   · There are essentially three types of sling; the arm sling for injuries to the forearm, the elevated sling for injuries to the shoulder, and the 'collar-and-cuff' or clove hitch for injuries to the upper arm and as supplementary support to fractured ribs.   · After application of any sling, always check the circulation to the limb by feeling for the pulse at the wrist, or by squeezing a fingernail and observing for change of color in the nail bed. All slings must be in a position that is comfortable for the injured person. Never force an arm into the 'right position'.   ARM SLING  · Support the injured forearm approximately parallel to the ground with the wrist slightly higher than the elbow.   · Place an opened triangular bandage between the body and the arm, with its apex towards the elbow.   · Extend the upper point of the bandage over the shoulder on the uninjured side.   · Bring the lower point up over the arm, across the shoulder on the injured side to join the upper point and tie firmly with a reef knot.   · Ensure the elbow is secured by folding the excess bandage over the elbow and securing with a safety pin.   ELEVATED SLING  · Support the injured arm with the elbow beside the body and the hand extended towards the uninjured shoulder.   · Place an opened triangular bandage over the forearm and hand, with the apex towards the elbow.   · Extend the upper point of the bandage over the uninjured shoulder.   · Tuck the lower part of the bandage under the injured arm, bring it under the elbow and around the back and extend the lower point up to meet the upper point at the shoulder.   · Tie firmly with a reef knot.   · Secure the elbow by folding the excess material and applying a safety pin, then ensure that the sling is tucked under the arm giving firm support.   COLLAR-AND-CUFF (CLOVE HITCH)   · Allow the elbow to hang naturally at the  "side and place the hand extended towards the shoulder on the uninjured side.   · Form a clove hitch by forming two loops - one towards you, one away from you.   · Put the loops together by sliding your hands under the loops and closing with a \"clapping\" motion. If you are experienced at forming a clove hitch, then apply a clove hitch directly on the wrist, but take care not to move the injured arm.   · Slide the clove hitch over the hand and gently pull it firmly to secure the wrist.   · Extend the points of the bandage to either side of the neck and tie firmly with a reef knot.   · Allow the arm to hang comfortably. For support to fractured ribs, apply triangular bandages around the body and upper arm to hold the arm firmly against the chest.   If your caregiver has given you a follow-up appointment, it is very important to keep that appointment. This includes any orthopedic referrals, physical therapy, and rehabilitation. Any delay in obtaining necessary care could result in a delay or failure of the bones to heal. Not keeping the appointment could result in a chronic or permanent injury, pain, and disability. If there is any problem keeping the appointment, you must call back to this facility for assistance.     "

## 2022-01-03 ENCOUNTER — APPOINTMENT (OUTPATIENT)
Dept: MEDICAL GROUP | Facility: CLINIC | Age: 32
End: 2022-01-03
Payer: COMMERCIAL

## 2022-03-28 ENCOUNTER — OFFICE VISIT (OUTPATIENT)
Dept: URGENT CARE | Facility: CLINIC | Age: 32
End: 2022-03-28
Payer: COMMERCIAL

## 2022-03-28 VITALS
OXYGEN SATURATION: 98 % | HEIGHT: 76 IN | DIASTOLIC BLOOD PRESSURE: 64 MMHG | WEIGHT: 234 LBS | HEART RATE: 76 BPM | SYSTOLIC BLOOD PRESSURE: 116 MMHG | RESPIRATION RATE: 18 BRPM | BODY MASS INDEX: 28.49 KG/M2 | TEMPERATURE: 97.7 F

## 2022-03-28 DIAGNOSIS — H61.22 IMPACTED CERUMEN OF LEFT EAR: ICD-10-CM

## 2022-03-28 PROBLEM — F41.0 PANIC DISORDER: Status: ACTIVE | Noted: 2021-05-20

## 2022-03-28 PROBLEM — F34.0 CYCLOTHYMIA: Status: ACTIVE | Noted: 2021-05-20

## 2022-03-28 PROCEDURE — 99213 OFFICE O/P EST LOW 20 MIN: CPT | Performed by: STUDENT IN AN ORGANIZED HEALTH CARE EDUCATION/TRAINING PROGRAM

## 2022-03-28 NOTE — PROGRESS NOTES
"Subjective:   Pantera Connelly is a 31 y.o. male who presents for Hearing Problem (Lt ear, as he was using a q-tip x today, clogged )      HPI:  Pleasant 31-year-old male presents to clinic with left ear fullness and muffled hearing x1 day.  Patient states that he started feeling like his left ear was muffled today.  Because of this he tried to use a Q-tip to remove wax with no success.  Patient denies fever, chills, nasal congestion, ear pain, sore throat, cough, ear discharge, trauma to the ear, foreign body, headache, sinus pain/pressure, dizziness, ear ringing, shortness of breath, chest pain, nausea, or vomiting.        Medications:    • acetaminophen Tabs  • ADVIL PO  • albuterol Aers  • benzonatate Caps  • DAYQUIL/NYQUIL COLD/FLU RELIEF PO    Allergies: Other drug    Problem List: Pantera Connelly does not have a problem list on file.    Surgical History:  Past Surgical History:   Procedure Laterality Date   • SEPTOPLASTY  8/13/2012    Performed by IJEOMA SMART at SURGERY SAME DAY [facility] ORS   • TURBINOPLASTY  8/13/2012    [facility]OMA SMART [facility]   • NASAL FRACTURE REDUCTION OPEN  8/13/2012    Performed by SHAJI POSADAS at SURGERY SAME DAY [facility] ORS   • OTHER      WISDOM TEETH REMOVED       Past Social Hx: Pantera Connelly  reports that he has never smoked. He has never used smokeless tobacco. He reports previous alcohol use. He reports that he does not use drugs.     Past Family Hx:  Pantera Connelly family history includes Diabetes in his father and mother; Hypertension in his father and mother; Lung Disease in his sister.     Problem list, medications, and allergies reviewed by myself today in Epic.     Objective:     /64 (BP Location: Left arm, Patient Position: Sitting, BP Cuff Size: Adult)   Pulse 76   Temp 36.5 °C (97.7 °F) (Temporal)   Resp 18   Ht 1.93 m (6' 4\")   Wt 106 kg (234 lb)   SpO2 98%   BMI 28.48 kg/m² "     Physical Exam  Vitals reviewed.   Constitutional:       General: He is not in acute distress.     Appearance: Normal appearance.   HENT:      Head: Normocephalic.      Right Ear: Tympanic membrane, ear canal and external ear normal.      Left Ear: Tympanic membrane, ear canal and external ear normal. There is impacted cerumen.      Ears:      Comments: Patient had impacted cerumen in the left ear.  Left ear was irrigated and cerumen impaction was removed.  Reexamination of the ear showed normal TM and normal ear canal.  No signs of infection.     Nose: Nose normal.      Mouth/Throat:      Mouth: Mucous membranes are moist.   Eyes:      Conjunctiva/sclera: Conjunctivae normal.      Pupils: Pupils are equal, round, and reactive to light.   Cardiovascular:      Rate and Rhythm: Normal rate and regular rhythm.      Pulses: Normal pulses.      Heart sounds: Normal heart sounds. No murmur heard.  Pulmonary:      Effort: Pulmonary effort is normal. No respiratory distress.      Breath sounds: Normal breath sounds. No stridor. No wheezing, rhonchi or rales.   Musculoskeletal:      Cervical back: Normal range of motion.   Lymphadenopathy:      Cervical: No cervical adenopathy.   Skin:     General: Skin is warm and dry.      Capillary Refill: Capillary refill takes less than 2 seconds.      Findings: No erythema, lesion or rash.   Neurological:      General: No focal deficit present.      Mental Status: He is alert and oriented to person, place, and time.         Assessment/Plan:     Diagnosis and associated orders:     1. Impacted cerumen of left ear        Comments/MDM:     • Patient's physical exam showed impacted cerumen in left ear.  Left ear was irrigated and cerumen impaction was removed.  Reexamination of the ear showed no signs of infection to the TM or your canal.  Patient states that his hearing has already greatly improved during reexamination.  Radiation the ear was tolerated without complication.          Differential diagnosis, natural history, supportive care, and indications for immediate follow-up discussed.    Advised the patient to follow-up with the primary care physician for recheck, reevaluation, and consideration of further management.    Time spent evaluating the patient was 22 minutes which included preparing for the visit, obtaining history, examination, ordering labs/tests/procedures/medications, independent interpretation, discussion of plan, counseling/education, medical information reconciliation, and documentation into chart.     Please note that this dictation was created using voice recognition software. I have made a reasonable attempt to correct obvious errors, but I expect that there are errors of grammar and possibly content that I did not discover before finalizing the note.    Electronically signed by Joe Segundo PA-C.

## 2022-08-20 ENCOUNTER — APPOINTMENT (OUTPATIENT)
Dept: RADIOLOGY | Facility: IMAGING CENTER | Age: 32
End: 2022-08-20
Attending: PHYSICIAN ASSISTANT
Payer: COMMERCIAL

## 2022-08-20 ENCOUNTER — OFFICE VISIT (OUTPATIENT)
Dept: URGENT CARE | Facility: CLINIC | Age: 32
End: 2022-08-20
Payer: COMMERCIAL

## 2022-08-20 VITALS
WEIGHT: 233 LBS | OXYGEN SATURATION: 97 % | TEMPERATURE: 98 F | HEIGHT: 76 IN | BODY MASS INDEX: 28.37 KG/M2 | DIASTOLIC BLOOD PRESSURE: 86 MMHG | RESPIRATION RATE: 16 BRPM | HEART RATE: 66 BPM | SYSTOLIC BLOOD PRESSURE: 122 MMHG

## 2022-08-20 DIAGNOSIS — M79.674 GREAT TOE PAIN, RIGHT: ICD-10-CM

## 2022-08-20 PROCEDURE — 99213 OFFICE O/P EST LOW 20 MIN: CPT | Performed by: PHYSICIAN ASSISTANT

## 2022-08-20 PROCEDURE — 73660 X-RAY EXAM OF TOE(S): CPT | Mod: TC,FY,RT | Performed by: RADIOLOGY

## 2022-08-20 RX ORDER — PREDNISONE 20 MG/1
TABLET ORAL
Qty: 10 TABLET | Refills: 0 | Status: SHIPPED
Start: 2022-08-20 | End: 2023-03-23

## 2022-08-20 NOTE — PROGRESS NOTES
"  Subjective:   Pantera Connelly is a 31 y.o. male who presents today with   Chief Complaint   Patient presents with    Toe Pain     Right foot, big toe, Started yesterday morning, pain is getting worse, slightly swollen.\"      Toe Injury  This is a new problem. The current episode started yesterday. The problem occurs constantly. The problem has been unchanged. The symptoms are aggravated by bending and walking. He has tried NSAIDs for the symptoms. The treatment provided mild relief.   Patient states that he does practice [employer] but has not had any recent injury or trauma.     PMH:  has a past medical history of Heart burn, Indigestion, and Pneumonia.  MEDS:   Current Outpatient Medications:     predniSONE (DELTASONE) 20 MG Tab, Take 1 tab twice daily for 5 days., Disp: 10 Tablet, Rfl: 0    acetaminophen (TYLENOL) 500 MG Tab, Take 500-1,000 mg by mouth every 6 hours as needed., Disp: , Rfl:     Ibuprofen (ADVIL PO), Take  by mouth., Disp: , Rfl:     albuterol 108 (90 Base) MCG/ACT Aero Soln inhalation aerosol, Inhale 2 Puffs by mouth every 6 hours as needed for Shortness of Breath. (Patient not taking: No sig reported), Disp: 8.5 g, Rfl: 0  ALLERGIES:   Allergies   Allergen Reactions    Other Drug      'ANTIBIOTIC,NAME UNKNOWN,CAUSES RASH'     SURGHX:   Past Surgical History:   Procedure Laterality Date    SEPTOPLASTY  8/13/2012    Performed by IJEOMA SMART at SURGERY SAME DAY [facility] ORS    TURBINOPLASTY  8/13/2012    Performed by IJEOMA SMART at SURGERY SAME DAY [facility] ORS    NASAL FRACTURE REDUCTION OPEN  8/13/2012    Performed by SHAJI POSADAS at SURGERY SAME DAY [facility] ORS    OTHER      WISDOM TEETH REMOVED     SOCHX:  reports that he has never smoked. He has never used smokeless tobacco. He reports that he does not currently use alcohol. He reports that he does not use drugs.  FH: Reviewed with patient, not pertinent to this visit.     Review of Systems   Musculoskeletal:         " "Right great toe pain      Objective:   /86 (BP Location: Right arm, Patient Position: Sitting, BP Cuff Size: Adult)   Pulse 66   Temp 36.7 °C (98 °F) (Temporal)   Resp 16   Ht 1.93 m (6' 4\")   Wt 106 kg (233 lb)   SpO2 97%   BMI 28.36 kg/m²   Physical Exam  Vitals and nursing note reviewed.   Constitutional:       General: He is not in acute distress.     Appearance: Normal appearance. He is well-developed. He is not ill-appearing or toxic-appearing.   HENT:      Head: Normocephalic and atraumatic.      Right Ear: Hearing normal.      Left Ear: Hearing normal.   Cardiovascular:      Rate and Rhythm: Normal rate.   Pulmonary:      Effort: Pulmonary effort is normal.   Musculoskeletal:        Legs:       Comments: Tenderness to palpation of the distal joint of the right great toe.  Neurovascular intact distally.  Less than 2 capillary refill.  No streaking or surrounding erythema or drainage or discharge.  There is a small superficial abrasion to the dorsum of the toe with no surrounding necrosis.  More pain with plantarflexion versus dorsiflexion.   Skin:     General: Skin is warm and dry.   Neurological:      Mental Status: He is alert.      Coordination: Coordination normal.   Psychiatric:         Mood and Affect: Mood normal.     DX TOE  FINDINGS:  There is no evidence of acute fracture. There is no evidence of dislocation. No cortical disruption is identified. No bone erosions are identified.     IMPRESSION:     No acute fracture or dislocation identified.    Assessment/Plan:   Assessment    1. Great toe pain, right  - DX-TOE(S) 2+ RIGHT; Future  - predniSONE (DELTASONE) 20 MG Tab; Take 1 tab twice daily for 5 days.  Dispense: 10 Tablet; Refill: 0  Inflammation versus strain of the right great toe.  No signs of infection at this time.  Recommend rest, ice and elevation.  Patient does have abrasion to the dorsum of the toe but it does not look infectious at this point and recommend he continue " monitoring it and place antibiotic ointment and bandage to the area and keep it clean and covered.  Signs of infection discussed and patient is understanding to return to clinic if any of the symptoms occur.  Offered walking boot but patient declines.    Differential diagnosis, natural history, supportive care, and indications for immediate follow-up discussed.   Patient given instructions and understanding of medications and treatment.    If not improving in 3-5 days, F/U with PCP or return to UC if symptoms worsen.    Patient agreeable to plan.    Please note that this dictation was created using voice recognition software. I have made every reasonable attempt to correct obvious errors, but I expect that there are errors of grammar and possibly content that I did not discover before finalizing the note.    Sammy Vallejo PA-C

## 2022-12-15 ENCOUNTER — HOSPITAL ENCOUNTER (EMERGENCY)
Facility: MEDICAL CENTER | Age: 32
End: 2022-12-15
Payer: COMMERCIAL

## 2023-03-14 ENCOUNTER — HOSPITAL ENCOUNTER (OUTPATIENT)
Dept: RADIOLOGY | Facility: MEDICAL CENTER | Age: 33
End: 2023-03-14
Attending: FAMILY MEDICINE
Payer: COMMERCIAL

## 2023-03-14 DIAGNOSIS — R31.0 GROSS HEMATURIA: ICD-10-CM

## 2023-03-14 PROCEDURE — 74178 CT ABD&PLV WO CNTR FLWD CNTR: CPT

## 2023-03-14 PROCEDURE — 700117 HCHG RX CONTRAST REV CODE 255: Performed by: FAMILY MEDICINE

## 2023-03-14 RX ADMIN — IOHEXOL 100 ML: 350 INJECTION, SOLUTION INTRAVENOUS at 18:26

## 2023-03-23 ENCOUNTER — APPOINTMENT (OUTPATIENT)
Dept: RADIOLOGY | Facility: MEDICAL CENTER | Age: 33
End: 2023-03-23
Attending: EMERGENCY MEDICINE
Payer: COMMERCIAL

## 2023-03-23 ENCOUNTER — APPOINTMENT (OUTPATIENT)
Dept: RADIOLOGY | Facility: MEDICAL CENTER | Age: 33
End: 2023-03-23
Attending: UROLOGY
Payer: COMMERCIAL

## 2023-03-23 ENCOUNTER — HOSPITAL ENCOUNTER (OUTPATIENT)
Facility: MEDICAL CENTER | Age: 33
End: 2023-03-23
Attending: EMERGENCY MEDICINE | Admitting: INTERNAL MEDICINE
Payer: COMMERCIAL

## 2023-03-23 VITALS
DIASTOLIC BLOOD PRESSURE: 62 MMHG | OXYGEN SATURATION: 94 % | SYSTOLIC BLOOD PRESSURE: 138 MMHG | TEMPERATURE: 97.6 F | BODY MASS INDEX: 27.3 KG/M2 | HEART RATE: 67 BPM | RESPIRATION RATE: 18 BRPM | HEIGHT: 76 IN | WEIGHT: 224.21 LBS

## 2023-03-23 DIAGNOSIS — R10.9 FLANK PAIN: ICD-10-CM

## 2023-03-23 DIAGNOSIS — N20.0 KIDNEY STONE: Primary | ICD-10-CM

## 2023-03-23 PROBLEM — K76.0 FATTY LIVER: Status: ACTIVE | Noted: 2023-03-23

## 2023-03-23 LAB
ALBUMIN SERPL BCP-MCNC: 4.4 G/DL (ref 3.2–4.9)
ALBUMIN/GLOB SERPL: 1.4 G/DL
ALP SERPL-CCNC: 113 U/L (ref 30–99)
ALT SERPL-CCNC: 89 U/L (ref 2–50)
ANION GAP SERPL CALC-SCNC: 12 MMOL/L (ref 7–16)
APPEARANCE UR: CLEAR
AST SERPL-CCNC: 55 U/L (ref 12–45)
BASOPHILS # BLD AUTO: 0 % (ref 0–1.8)
BASOPHILS # BLD: 0 K/UL (ref 0–0.12)
BILIRUB SERPL-MCNC: 1.6 MG/DL (ref 0.1–1.5)
BILIRUB UR QL STRIP.AUTO: NEGATIVE
BUN SERPL-MCNC: 9 MG/DL (ref 8–22)
CALCIUM ALBUM COR SERPL-MCNC: 9 MG/DL (ref 8.5–10.5)
CALCIUM SERPL-MCNC: 9.3 MG/DL (ref 8.4–10.2)
CHLORIDE SERPL-SCNC: 101 MMOL/L (ref 96–112)
CO2 SERPL-SCNC: 22 MMOL/L (ref 20–33)
COLOR UR: YELLOW
CREAT SERPL-MCNC: 0.98 MG/DL (ref 0.5–1.4)
EOSINOPHIL # BLD AUTO: 0.12 K/UL (ref 0–0.51)
EOSINOPHIL NFR BLD: 2 % (ref 0–6.9)
EPI CELLS #/AREA URNS HPF: ABNORMAL /HPF
ERYTHROCYTE [DISTWIDTH] IN BLOOD BY AUTOMATED COUNT: 38.9 FL (ref 35.9–50)
GFR SERPLBLD CREATININE-BSD FMLA CKD-EPI: 105 ML/MIN/1.73 M 2
GIANT PLATELETS BLD QL SMEAR: NORMAL
GLOBULIN SER CALC-MCNC: 3.1 G/DL (ref 1.9–3.5)
GLUCOSE SERPL-MCNC: 112 MG/DL (ref 65–99)
GLUCOSE UR STRIP.AUTO-MCNC: NEGATIVE MG/DL
HCT VFR BLD AUTO: 41.8 % (ref 42–52)
HGB BLD-MCNC: 14.5 G/DL (ref 14–18)
KETONES UR STRIP.AUTO-MCNC: NEGATIVE MG/DL
LEUKOCYTE ESTERASE UR QL STRIP.AUTO: NEGATIVE
LYMPHOCYTES # BLD AUTO: 2.66 K/UL (ref 1–4.8)
LYMPHOCYTES NFR BLD: 45 % (ref 22–41)
MANUAL DIFF BLD: NORMAL
MCH RBC QN AUTO: 29.5 PG (ref 27–33)
MCHC RBC AUTO-ENTMCNC: 34.7 G/DL (ref 33.7–35.3)
MCV RBC AUTO: 85 FL (ref 81.4–97.8)
METAMYELOCYTES NFR BLD MANUAL: 1 %
MICRO URNS: ABNORMAL
MONOCYTES # BLD AUTO: 0.41 K/UL (ref 0–0.85)
MONOCYTES NFR BLD AUTO: 7 % (ref 0–13.4)
MUCOUS THREADS #/AREA URNS HPF: ABNORMAL /HPF
NEUTROPHILS # BLD AUTO: 2.66 K/UL (ref 1.82–7.42)
NEUTROPHILS NFR BLD: 44 % (ref 44–72)
NEUTS BAND NFR BLD MANUAL: 1 % (ref 0–10)
NITRITE UR QL STRIP.AUTO: NEGATIVE
NRBC # BLD AUTO: 0 K/UL
NRBC BLD-RTO: 0 /100 WBC
PH UR STRIP.AUTO: 6.5 [PH] (ref 5–8)
PLATELET # BLD AUTO: 179 K/UL (ref 164–446)
PLATELET BLD QL SMEAR: NORMAL
PMV BLD AUTO: 10.9 FL (ref 9–12.9)
POTASSIUM SERPL-SCNC: 4.2 MMOL/L (ref 3.6–5.5)
PROT SERPL-MCNC: 7.5 G/DL (ref 6–8.2)
PROT UR QL STRIP: NEGATIVE MG/DL
RBC # BLD AUTO: 4.92 M/UL (ref 4.7–6.1)
RBC # URNS HPF: ABNORMAL /HPF
RBC BLD AUTO: NORMAL
RBC UR QL AUTO: ABNORMAL
SODIUM SERPL-SCNC: 135 MMOL/L (ref 135–145)
SP GR UR STRIP.AUTO: 1.01
UNIDENT CRYS URNS QL MICRO: ABNORMAL /HPF
VARIANT LYMPHS BLD QL SMEAR: NORMAL
WBC # BLD AUTO: 5.9 K/UL (ref 4.8–10.8)
WBC #/AREA URNS HPF: ABNORMAL /HPF

## 2023-03-23 PROCEDURE — 81001 URINALYSIS AUTO W/SCOPE: CPT

## 2023-03-23 PROCEDURE — 36415 COLL VENOUS BLD VENIPUNCTURE: CPT

## 2023-03-23 PROCEDURE — 85025 COMPLETE CBC W/AUTO DIFF WBC: CPT

## 2023-03-23 PROCEDURE — 96374 THER/PROPH/DIAG INJ IV PUSH: CPT

## 2023-03-23 PROCEDURE — 700105 HCHG RX REV CODE 258: Performed by: EMERGENCY MEDICINE

## 2023-03-23 PROCEDURE — G0378 HOSPITAL OBSERVATION PER HR: HCPCS

## 2023-03-23 PROCEDURE — 99291 CRITICAL CARE FIRST HOUR: CPT

## 2023-03-23 PROCEDURE — 700111 HCHG RX REV CODE 636 W/ 250 OVERRIDE (IP): Mod: JW | Performed by: EMERGENCY MEDICINE

## 2023-03-23 PROCEDURE — 80053 COMPREHEN METABOLIC PANEL: CPT

## 2023-03-23 PROCEDURE — 99222 1ST HOSP IP/OBS MODERATE 55: CPT | Performed by: INTERNAL MEDICINE

## 2023-03-23 PROCEDURE — 76705 ECHO EXAM OF ABDOMEN: CPT

## 2023-03-23 PROCEDURE — 74018 RADEX ABDOMEN 1 VIEW: CPT

## 2023-03-23 PROCEDURE — 85007 BL SMEAR W/DIFF WBC COUNT: CPT

## 2023-03-23 RX ORDER — KETOROLAC TROMETHAMINE 10 MG/1
10 TABLET, FILM COATED ORAL EVERY 6 HOURS PRN
Qty: 15 TABLET | Refills: 0 | Status: SHIPPED | OUTPATIENT
Start: 2023-03-23

## 2023-03-23 RX ORDER — POLYETHYLENE GLYCOL 3350 17 G/17G
1 POWDER, FOR SOLUTION ORAL
Status: DISCONTINUED | OUTPATIENT
Start: 2023-03-23 | End: 2023-03-23 | Stop reason: HOSPADM

## 2023-03-23 RX ORDER — BISACODYL 10 MG
10 SUPPOSITORY, RECTAL RECTAL
Status: DISCONTINUED | OUTPATIENT
Start: 2023-03-23 | End: 2023-03-23 | Stop reason: HOSPADM

## 2023-03-23 RX ORDER — ACETAMINOPHEN 325 MG/1
650 TABLET ORAL EVERY 6 HOURS PRN
Status: DISCONTINUED | OUTPATIENT
Start: 2023-03-23 | End: 2023-03-23 | Stop reason: HOSPADM

## 2023-03-23 RX ORDER — SODIUM CHLORIDE, SODIUM LACTATE, POTASSIUM CHLORIDE, CALCIUM CHLORIDE 600; 310; 30; 20 MG/100ML; MG/100ML; MG/100ML; MG/100ML
1000 INJECTION, SOLUTION INTRAVENOUS ONCE
Status: COMPLETED | OUTPATIENT
Start: 2023-03-23 | End: 2023-03-23

## 2023-03-23 RX ORDER — AMOXICILLIN 250 MG
2 CAPSULE ORAL 2 TIMES DAILY
Status: DISCONTINUED | OUTPATIENT
Start: 2023-03-24 | End: 2023-03-23 | Stop reason: HOSPADM

## 2023-03-23 RX ORDER — ALBUTEROL SULFATE 90 UG/1
2 AEROSOL, METERED RESPIRATORY (INHALATION) EVERY 6 HOURS PRN
Status: DISCONTINUED | OUTPATIENT
Start: 2023-03-23 | End: 2023-03-23

## 2023-03-23 RX ORDER — ONDANSETRON 2 MG/ML
4 INJECTION INTRAMUSCULAR; INTRAVENOUS EVERY 4 HOURS PRN
Status: DISCONTINUED | OUTPATIENT
Start: 2023-03-23 | End: 2023-03-23 | Stop reason: HOSPADM

## 2023-03-23 RX ORDER — OXYCODONE HYDROCHLORIDE 5 MG/1
5 TABLET ORAL
Status: DISCONTINUED | OUTPATIENT
Start: 2023-03-23 | End: 2023-03-23 | Stop reason: HOSPADM

## 2023-03-23 RX ORDER — KETOROLAC TROMETHAMINE 30 MG/ML
15 INJECTION, SOLUTION INTRAMUSCULAR; INTRAVENOUS ONCE
Status: COMPLETED | OUTPATIENT
Start: 2023-03-23 | End: 2023-03-23

## 2023-03-23 RX ORDER — PROCHLORPERAZINE EDISYLATE 5 MG/ML
5-10 INJECTION INTRAMUSCULAR; INTRAVENOUS EVERY 4 HOURS PRN
Status: DISCONTINUED | OUTPATIENT
Start: 2023-03-23 | End: 2023-03-23 | Stop reason: HOSPADM

## 2023-03-23 RX ORDER — HYDROMORPHONE HYDROCHLORIDE 1 MG/ML
0.25 INJECTION, SOLUTION INTRAMUSCULAR; INTRAVENOUS; SUBCUTANEOUS
Status: DISCONTINUED | OUTPATIENT
Start: 2023-03-23 | End: 2023-03-23 | Stop reason: HOSPADM

## 2023-03-23 RX ORDER — ONDANSETRON 4 MG/1
4 TABLET, ORALLY DISINTEGRATING ORAL EVERY 4 HOURS PRN
Status: DISCONTINUED | OUTPATIENT
Start: 2023-03-23 | End: 2023-03-23 | Stop reason: HOSPADM

## 2023-03-23 RX ORDER — PROMETHAZINE HYDROCHLORIDE 25 MG/1
12.5-25 TABLET ORAL EVERY 4 HOURS PRN
Status: DISCONTINUED | OUTPATIENT
Start: 2023-03-23 | End: 2023-03-23 | Stop reason: HOSPADM

## 2023-03-23 RX ORDER — ENOXAPARIN SODIUM 100 MG/ML
40 INJECTION SUBCUTANEOUS DAILY
Status: DISCONTINUED | OUTPATIENT
Start: 2023-03-24 | End: 2023-03-23 | Stop reason: HOSPADM

## 2023-03-23 RX ORDER — OXYCODONE HYDROCHLORIDE 5 MG/1
2.5 TABLET ORAL
Status: DISCONTINUED | OUTPATIENT
Start: 2023-03-23 | End: 2023-03-23 | Stop reason: HOSPADM

## 2023-03-23 RX ORDER — DEXTROSE AND SODIUM CHLORIDE 5; .45 G/100ML; G/100ML
INJECTION, SOLUTION INTRAVENOUS CONTINUOUS
Status: DISCONTINUED | OUTPATIENT
Start: 2023-03-23 | End: 2023-03-23 | Stop reason: HOSPADM

## 2023-03-23 RX ORDER — TAMSULOSIN HYDROCHLORIDE 0.4 MG/1
0.4 CAPSULE ORAL EVERY EVENING
Status: ON HOLD | COMMUNITY
End: 2023-03-23 | Stop reason: SDUPTHER

## 2023-03-23 RX ORDER — SODIUM CHLORIDE, SODIUM LACTATE, POTASSIUM CHLORIDE, CALCIUM CHLORIDE 600; 310; 30; 20 MG/100ML; MG/100ML; MG/100ML; MG/100ML
INJECTION, SOLUTION INTRAVENOUS CONTINUOUS
Status: DISCONTINUED | OUTPATIENT
Start: 2023-03-23 | End: 2023-03-23 | Stop reason: HOSPADM

## 2023-03-23 RX ORDER — KETOROLAC TROMETHAMINE 30 MG/ML
15 INJECTION, SOLUTION INTRAMUSCULAR; INTRAVENOUS EVERY 6 HOURS PRN
Status: DISCONTINUED | OUTPATIENT
Start: 2023-03-23 | End: 2023-03-23 | Stop reason: HOSPADM

## 2023-03-23 RX ORDER — ERGOCALCIFEROL 1.25 MG/1
50000 CAPSULE ORAL
COMMUNITY
Start: 2023-02-22

## 2023-03-23 RX ORDER — TAMSULOSIN HYDROCHLORIDE 0.4 MG/1
0.4 CAPSULE ORAL EVERY EVENING
Qty: 30 CAPSULE | Refills: 1 | Status: SHIPPED | OUTPATIENT
Start: 2023-03-23

## 2023-03-23 RX ORDER — PROMETHAZINE HYDROCHLORIDE 25 MG/1
12.5-25 SUPPOSITORY RECTAL EVERY 4 HOURS PRN
Status: DISCONTINUED | OUTPATIENT
Start: 2023-03-23 | End: 2023-03-23 | Stop reason: HOSPADM

## 2023-03-23 RX ADMIN — KETOROLAC TROMETHAMINE 15 MG: 30 INJECTION, SOLUTION INTRAMUSCULAR; INTRAVENOUS at 11:45

## 2023-03-23 RX ADMIN — SODIUM CHLORIDE, POTASSIUM CHLORIDE, SODIUM LACTATE AND CALCIUM CHLORIDE 1000 ML: 600; 310; 30; 20 INJECTION, SOLUTION INTRAVENOUS at 11:47

## 2023-03-23 ASSESSMENT — ENCOUNTER SYMPTOMS
RESPIRATORY NEGATIVE: 1
GASTROINTESTINAL NEGATIVE: 1
PSYCHIATRIC NEGATIVE: 1
CHILLS: 1
EYES NEGATIVE: 1
CONSTITUTIONAL NEGATIVE: 1
FLANK PAIN: 1
CARDIOVASCULAR NEGATIVE: 1
NEUROLOGICAL NEGATIVE: 1
NAUSEA: 1

## 2023-03-23 ASSESSMENT — LIFESTYLE VARIABLES
HAVE YOU EVER FELT YOU SHOULD CUT DOWN ON YOUR DRINKING: NO
DO YOU DRINK ALCOHOL: YES
HAVE PEOPLE ANNOYED YOU BY CRITICIZING YOUR DRINKING: NO
HOW MANY TIMES IN THE PAST YEAR HAVE YOU HAD 5 OR MORE DRINKS IN A DAY: 0
TOTAL SCORE: 0
TOTAL SCORE: 0
AVERAGE NUMBER OF DAYS PER WEEK YOU HAVE A DRINK CONTAINING ALCOHOL: 3
CONSUMPTION TOTAL: NEGATIVE
EVER HAD A DRINK FIRST THING IN THE MORNING TO STEADY YOUR NERVES TO GET RID OF A HANGOVER: NO
ON A TYPICAL DAY WHEN YOU DRINK ALCOHOL HOW MANY DRINKS DO YOU HAVE: 2
EVER FELT BAD OR GUILTY ABOUT YOUR DRINKING: NO
TOTAL SCORE: 0

## 2023-03-23 ASSESSMENT — PAIN DESCRIPTION - PAIN TYPE
TYPE: ACUTE PAIN
TYPE: ACUTE PAIN

## 2023-03-23 NOTE — DISCHARGE SUMMARY
Discharge Summary    CHIEF COMPLAINT ON ADMISSION  Chief Complaint   Patient presents with    Flank Pain     L flank pain- dx with kidney stone approx 12 days ago. Reports pain is worsening and also experiencing chills and nausea.       Reason for Admission  side pain;back pain;nausea     Admission Date  3/23/2023    CODE STATUS  Full Code    HPI & HOSPITAL COURSE  Pantera Connelly is a 32 y.o. male who presents with left flank pain.  Pain started about 12 days ago.  Initially had pain with urination and hematuria.  Hematuria and dysuria has resolved but patient has persistent left flank pain which is now severe.  He also endorsed some chills.  He has not had fever.  He feels nauseous but has not been vomiting.  Normal bowel movements.  Also reports being athletic which would explain his bradycardia.     In the ED labs were benign for signs of infection.  Of note his liver enzymes were elevated with a 2:1 hepatocellular pattern.  But this is very mild.  Alk phos and total bili were also very mildly elevated.     X-ray of abdomen identified 4 x 6 mm ureteral calculus.  Urology was consulted and Dr. Warren recommended lithotripsy today.  Patient was admitted and was made n.p.o. however upon arrival in preop patient ultimately decided to leave AMA and pursue outpatient follow up for lithotripsy.     Therefore, he is discharged in fair and stable condition against medcial advice.    The patient recovered much more quickly than anticipated on admission.    Discharge Date  3/23/2023    FOLLOW UP ITEMS POST DISCHARGE  Follow-up with [facility] [place]    DISCHARGE DIAGNOSES  Principal Problem:    Kidney stone POA: Yes  Active Problems:    Fatty liver POA: Unknown  Resolved Problems:    * No resolved hospital problems. *      FOLLOW UP  Advised to follow-up with outpatient urology    MEDICATIONS ON DISCHARGE     Medication List        START taking these medications        Instructions   ketorolac 10 MG Tabs  Commonly  known as: TORADOL   Take 1 Tablet by mouth every 6 hours as needed for Moderate Pain.  Dose: 10 mg            CONTINUE taking these medications        Instructions   acetaminophen 500 MG Tabs  Commonly known as: TYLENOL   Take 500-1,000 mg by mouth every 6 hours as needed.  Dose: 500-1,000 mg     ibuprofen 200 MG Tabs  Commonly known as: MOTRIN   Take 400 mg by mouth every 6 hours as needed (pain).  Dose: 400 mg     tamsulosin 0.4 MG capsule  Commonly known as: FLOMAX   Take 1 Capsule by mouth every evening.  Dose: 0.4 mg     vitamin D2 (Ergocalciferol) 1.25 MG (16634 UT) Caps capsule  Commonly known as: Drisdol   Take 50,000 Units by mouth every 7 days.  Dose: 50,000 Units              Allergies  Allergies   Allergen Reactions    Other Drug Unspecified     'ANTIBIOTIC,NAME UNKNOWN,CAUSES RASH when he was 8 years old    [place] Unspecified     Unknown reaction         DIET  Orders Placed This Encounter   Procedures    Diet NPO Restrict to: Sips with Medications     Standing Status:   Standing     Number of Occurrences:   1     Order Specific Question:   Diet NPO Restrict to:     Answer:   Sips with Medications [3]       ACTIVITY  As tolerated.  Weight bearing as tolerated    CONSULTATIONS  Urology    PROCEDURES  None    LABORATORY  Lab Results   Component Value Date    SODIUM 135 03/23/2023    POTASSIUM 4.2 03/23/2023    CHLORIDE 101 03/23/2023    CO2 22 03/23/2023    GLUCOSE 112 (H) 03/23/2023    BUN 9 03/23/2023    CREATININE 0.98 03/23/2023        Lab Results   Component Value Date    WBC 5.9 03/23/2023    HEMOGLOBIN 14.5 03/23/2023    HEMATOCRIT 41.8 (L) 03/23/2023    PLATELETCT 179 03/23/2023        Total time of the discharge process was 31 minutes.

## 2023-03-23 NOTE — ED NOTES
Pharmacy Medication Reconciliation      ~Medication reconciliation updated and complete per patient at bedside   ~Allergies have been verified and updated   ~No oral ABX within the last 30 days  ~Patient home pharmacy :  [facility]

## 2023-03-23 NOTE — H&P
[facility] [place] Consult/H&P Note      Patient's Name/MRN: Pantera Connelly, [id number]    Admit Date:3/23/2023  Today's Date: 3/23/2023   Length of stay:  LOS: 0 days   Room #: SMPREPOOL/NONE      Reason for consult/chief complaint: Left flank pain  ID/HPI: Pantera Connelly is a 32 y.o. male patient with approximately 2 weeks of left flank pain and known 4 x 7 mm left proximal ureteral stone that was imaged originally on 3/14/2023 with CT scan.  His primary care physician called earlier today explaining that he was seen in the primary care visit and his pain was not well controlled and he was failing medical expulsion therapy.  He was therefore instructed to present to the emergency room.  In the emergency room he was found to have 4 x 6 cm mid ureteral stone on KUB.  White blood cell count of 5.9, hematocrit 41.8, white blood cells 0-2 and 5-10 red blood cells on urine analysis with no other signs of infection.  His serum creatinine is 0.98.        Past Medical History:   Past Medical History:   Diagnosis Date    Heart burn     Indigestion     Pneumonia     3 years ago,approx 2009        Past Surgical History:   Past Surgical History:   Procedure Laterality Date    SEPTOPLASTY  8/13/2012    Performed by IJEOMA SMART at SURGERY SAME DAY [facility]S    TURBINOPLASTY  8/13/2012    [facility]    NASAL FRACTURE REDUCTION OPEN  8/13/2012    Performed [facility] SHAJI POSAD[facility]    OTHER      WISDOM TEETH REMOVED        Family History:   Family History   Problem Relation Age of Onset    Diabetes Mother     Hypertension Mother     Diabetes Father     Hypertension Father     Lung Disease Sister          Social History:   Social History     Tobacco Use    Smoking status: Never    Smokeless tobacco: Never   Vaping Use    Vaping Use: Never used   Substance Use Topics    Alcohol use: Not Currently    Drug use: No      Social History  "    Social History Narrative    Not on file        Allergies: he Other drug and [place]    Medications:   Medications Prior to Admission   Medication Sig Dispense Refill Last Dose    tamsulosin (FLOMAX) 0.4 MG capsule Take 0.4 mg by mouth every evening.   3/22/2023 at PM    vitamin D2, Ergocalciferol, (DRISDOL) 1.25 MG (86470 UT) Cap capsule Take 50,000 Units by mouth every 7 days.   3/18/2023 at UNK    acetaminophen (TYLENOL) 500 MG Tab Take 500-1,000 mg by mouth every 6 hours as needed.       ibuprofen (MOTRIN) 200 MG Tab Take 400 mg by mouth every 6 hours as needed (pain).   3/23/2023 at 0500         Review of Systems  Review of Systems   Constitutional: Negative.    HENT: Negative.     Eyes: Negative.    Respiratory: Negative.     Cardiovascular: Negative.    Gastrointestinal: Negative.    Genitourinary:  Positive for flank pain and hematuria.   Skin: Negative.    Neurological: Negative.    Endo/Heme/Allergies: Negative.    Psychiatric/Behavioral: Negative.     All other systems reviewed and are negative.     Physical Exam  VITAL SIGNS: /59   Pulse 65   Temp 35.9 °C (96.6 °F) (Temporal)   Resp 20   Ht 1.93 m (6' 4\")   Wt 102 kg (224 lb 3.3 oz)   SpO2 98%   BMI 27.29 kg/m²   Physical Exam  Vitals reviewed.   HENT:      Head: Normocephalic and atraumatic.   Eyes:      Conjunctiva/sclera: Conjunctivae normal.      Pupils: Pupils are equal, round, and reactive to light.   Cardiovascular:      Rate and Rhythm: Normal rate and regular rhythm.   Pulmonary:      Effort: Pulmonary effort is normal.   Abdominal:      Palpations: Abdomen is soft.   Musculoskeletal:         General: No tenderness.      Cervical back: Normal range of motion and neck supple.   Skin:     General: Skin is warm and dry.   Neurological:      Mental Status: He is alert and oriented to person, place, and time.   Psychiatric:         Mood and Affect: Mood and affect normal.         Cognition and Memory: Memory normal.         Judgment: " Judgment normal.         Labs:  Recent Labs     03/23/23  1141   WBC 5.9   RBC 4.92   HEMOGLOBIN 14.5   HEMATOCRIT 41.8*   MCV 85.0   MCH 29.5   MCHC 34.7   RDW 38.9   PLATELETCT 179   MPV 10.9     Recent Labs     03/23/23  1141   SODIUM 135   POTASSIUM 4.2   CHLORIDE 101   CO2 22   GLUCOSE 112*   BUN 9   CREATININE 0.98   CALCIUM 9.3         Glucose:  Recent Labs     03/23/23  1141   GLUCOSE 112*     Coags:  No results for input(s): INR in the last 72 hours.      Urinalysis:   Recent Labs     03/23/23  1124   COLORURINE Yellow   CLARITY Clear   SPECGRAVITY 1.010   PHURINE 6.5   GLUCOSEUR Negative   KETONES Negative   NITRITE Negative   OCCULTBLOOD Moderate*   RBCURINE 5-10*   EPITHELCELL Rare       Imaging:  [id number]-RUQ   Final Result      Hepatomegaly and steatosis with gallbladder fossa focal fatty sparing      [id number] VIEW   Final Result      Left 4 x 6 mm ureteral calculus is seen at the inferior L4 level indicating mild distal progression      DX-PORTABLE FLUOROSCOPY < 1 HOUR    (Results Pending)   [id number] VIEW    (Results Pending)       I personally reviewed his CT scan as well as his other imaging noted above.     Assessment/Recommendation   Is a 32-year-old male with 7 mm left proximal ureteral stone with failed medical management on outpatient.  He therefore presented to the emergency room after consultation with his primary care.  He is therefore indicated for ureteroscopic management of his kidney stone.  We discussed the procedure at length, we discussed the risk benefits and alternatives as outlined below, and the patient's questions were answered prior to signing consent.    The patient's pain was well controlled after a dose of Toradol in the emergency room.  The patient has a family history of kidney stones and they all had treatment with extracorporeal shockwave lithotripsy.  The patient would like to pursue extracorporeal shockwave lithotripsy.  I did explain that this is not offered in [place]  and he would need to seek treatment either in [facility], or [place].  I discussed that the location of the stone does not make it very amenable and I do not recommended typically to be done with shockwave lithotripsy.  Despite this and the fact that his pain is controlled the patient would like to forego planned ureteroscopy.  We discussed that the nature of kidney stones is to have periodic colic type pain that will come and go as the stone becomes lodged in the ureter.  We discussed that given the size of the stone is unlikely to pass on its own.    -He will be discharged from same-day surgery without having surgery and I have prescribed him a prescription for oral Toradol.  -I recommend that he follow-up with [facility] [place] in 4 weeks with a kidney ultrasound to see if he is passed the stone.  -The patient can pursue referrals to outside urologist for extracorporeal shockwave lithotripsy      Patricio Warren MD  [facility] [place]

## 2023-03-23 NOTE — ED NOTES
Attempted to call report to floor but unable to, transport here to take pt to per-op. Report given to per-op nurse by Lauro Bonilla RN

## 2023-03-23 NOTE — ED PROVIDER NOTES
ED Provider Note    CHIEF COMPLAINT  Chief Complaint   Patient presents with    Flank Pain     L flank pain- dx with kidney stone approx 12 days ago. Reports pain is worsening and also experiencing chills and nausea.       EXTERNAL RECORDS REVIEWED  Patient had a CT urogram on 3/14.  This was ordered by her primary doctor.  Urogram demonstrated 4 mm left proximal ureteral stone that was not obstructing.    HPI/ROS    Pantera Connelly is a 32 y.o. male who presents with left flank pain.  Pain started about 12 days ago.  Initially had pain with urination and hematuria.  Hematuria and dysuria has resolved but patient has persistent left flank pain is now severe.  He started to have some chills.  He has not had fever.  He feels nauseous has not been vomiting.  Normal bowel movements.    PAST MEDICAL HISTORY   has a past medical history of Heart burn, Indigestion, and Pneumonia.    SURGICAL HISTORY   has a past surgical history that includes other; septoplasty (8/13/2012); turbinoplasty (8/13/2012); and nasal fracture reduction open (8/13/2012).    FAMILY HISTORY  Family History   Problem Relation Age of Onset    Diabetes Mother     Hypertension Mother     Diabetes Father     Hypertension Father     Lung Disease Sister        SOCIAL HISTORY  Social History     Tobacco Use    Smoking status: Never    Smokeless tobacco: Never   Vaping Use    Vaping Use: Never used   Substance and Sexual Activity    Alcohol use: Not Currently    Drug use: No    Sexual activity: Not on file       CURRENT MEDICATIONS  Home Medications       Reviewed by Opal Renner R.N. (Registered Nurse) on 03/23/23 at 1111  Med List Status: Not Addressed     Medication Last Dose Status   acetaminophen (TYLENOL) 500 MG Tab  Active   albuterol 108 (90 Base) MCG/ACT Aero Soln inhalation aerosol  Active   Ibuprofen (ADVIL PO)  Active   predniSONE (DELTASONE) 20 MG Tab  Active                    ALLERGIES  Allergies   Allergen Reactions    Other Drug   "    'ANTIBIOTIC,NAME UNKNOWN,CAUSES RASH'    Other Food      walnuts       PHYSICAL EXAM  VITAL SIGNS: BP (!) 142/84   Pulse 77   Temp 35.9 °C (96.6 °F) (Temporal)   Resp 20   Ht 1.93 m (6' 4\")   Wt 102 kg (224 lb 3.3 oz)   SpO2 97%   BMI 27.29 kg/m²    Constitutional: Awake and alert.  Appears uncomfortable  HENT: Normal inspection  Eyes: Normal inspection  Neck: Grossly normal range of motion.  Cardiovascular: Normal heart rate  Thorax & Lungs: No respiratory distress  Abdomen: Soft, nondistended, no tenderness on the right side of the abdomen.  Mild discomfort left abdominal palpation  Extremities: Well perfused  Neurologic: Grossly normal   Psychiatric: Normal for situation      DIAGNOSTIC STUDIES / PROCEDURES    LABS  Results for orders placed or performed during the hospital encounter of 03/23/23   COMP METABOLIC PANEL   Result Value Ref Range    Sodium 135 135 - 145 mmol/L    Potassium 4.2 3.6 - 5.5 mmol/L    Chloride 101 96 - 112 mmol/L    Co2 22 20 - 33 mmol/L    Anion Gap 12.0 7.0 - 16.0    Glucose 112 (H) 65 - 99 mg/dL    Bun 9 8 - 22 mg/dL    Creatinine 0.98 0.50 - 1.40 mg/dL    Calcium 9.3 8.4 - 10.2 mg/dL    AST(SGOT) 55 (H) 12 - 45 U/L    ALT(SGPT) 89 (H) 2 - 50 U/L    Alkaline Phosphatase 113 (H) 30 - 99 U/L    Total Bilirubin 1.6 (H) 0.1 - 1.5 mg/dL    Albumin 4.4 3.2 - 4.9 g/dL    Total Protein 7.5 6.0 - 8.2 g/dL    Globulin 3.1 1.9 - 3.5 g/dL    A-G Ratio 1.4 g/dL   URINALYSIS CULTURE, IF INDICATED    Specimen: Urine, Clean Catch   Result Value Ref Range    Micro Urine Req Microscopic    CORRECTED CALCIUM   Result Value Ref Range    Correct Calcium 9.0 8.5 - 10.5 mg/dL   ESTIMATED GFR   Result Value Ref Range    GFR (CKD-EPI) 105 >60 mL/min/1.73 m 2        RADIOLOGY  I have independently interpreted the diagnostic imaging associated with this visit and am waiting the final reading from the radiologist.   My preliminary interpretation is as follows: Abdominal x-ray with kidney " stone      Radiologist interpretation:   US-RUQ   Final Result      Hepatomegaly and steatosis with gallbladder fossa focal fatty sparing      [id number] VIEW   Final Result      Left 4 x 6 mm ureteral calculus is seen at the inferior L4 level indicating mild distal progression            COURSE & MEDICAL DECISION MAKING    ED Observation Status? Yes; I am placing the patient in to an observation status due to a diagnostic uncertainty as well as therapeutic intensity. Patient placed in observation status at 11:22 PM, 3/23/2023.     Observation plan is as follows: Treat symptoms.  Ensure improvement.  Rule out urinary tract infection.  Rule out renal dysfunction.    Upon Reevaluation, the patient's condition has: Improved, but concerned about kidney stone    Patient discharged from ED Observation status at 2 PM 3/23/2023     INITIAL ASSESSMENT, COURSE AND PLAN  Care Narrative: Patient presents with left-sided flank pain.  Ongoing issue.  Reviewed chart noting previous CT scan.  He has a 4 mm stone.  Ordered x-ray of the abdomen to see if we could follow any progression.  Treat symptoms.  Obtain labs to rule out sepsis, UTI as he has had fever.  Treat symptoms with Toradol IV    Laboratory data began to return.  Patient has minimally elevated LFTs of unclear significance.  Repeat abdominal exam without any tenderness in the right upper quadrant.  Patient is not taking any acetaminophen.  Will obtain right upper quadrant ultrasound.  Pain resolved with Toradol    Urinalysis returned without evidence of infection.  Right upper quadrant ultrasound with hepatomegaly and steatosis.  This requires outpatient follow-up.    Contacted urology.  Discussed case with Dr. Warren.  Given ongoing symptoms surgical removal of stone is advised.  The patient did express interest in this.  Surgery will not be occurring until later this evening.  Will admit to hospitalist.  Patient remains pain-free.    Spoke with the patient about  the plan.  Exact procedure is not known by me at this time, but Dr. Warren will speak with the patient while in the hospital.  We will to proceed.    Discussed case with Rosetta TABARES.  She will admit.    ADDITIONAL PROBLEM LIST  They did liver function tests with [id number]-ay primary    DISPOSITION AND DISCUSSIONS  I have discussed management of the patient with the following physicians and SOHAIL's: Kelvin; Rosetta TABARES    FINAL DIAGNOSIS  Kidney stone  Elevated liver function tests  Hepatomegaly       Electronically signed by: Lester Barajas M.D., 3/23/2023 11:29 AM

## 2023-03-23 NOTE — ED NOTES
ERP bedside, CT tech awaiting on ERP eval to take to CT.     CT order discontinued CT tech aware. Pt awaiting Xray.

## 2023-03-23 NOTE — DISCHARGE INSTRUCTIONS
You have a left ureter stone the you have elected to try to pass on your own and/or seek out extracorporeal shockwave lithotripsy.    He will be discharged from the preoperative area with medications that can help control your pain and help ureter stones pass.    Call urology [place] to set up a follow-up appointment in approximately 4 weeks with kidney bladder ultrasound to see if you have passed the stone.          FOLLOW-UP:  We recommend an ultrasound at 4 weeks to see if you still have the stone.    WARNING SIGNS:  Fever greater than 101 degrees Fahrenheit, chills, nausea or vomiting, Large amount of clots in urine that make it difficult to urinate or for urine to drain from [place], increasing pain, or abdominal swelling. If you are experiencing these symptoms, call the Urology Clinic or go to your local PCP or emergency room.    It is normal to see blood in your urine for up to 2 weeks even from surgery. The urine may clear up entirely, and then turn bloody again a few days later depending on your activity level; do not be alarmed. However, if you experience severe pain or tenderness, have a lot of increased bleeding, or find that you are unable to urinate because of large clots, please notify your doctor immediately           Patricio Warren MD  [id number] [id number] [phone]

## 2023-03-23 NOTE — OR NURSING
Please see Dr. Warren's note for further info    Pt arrived to PreOp from ER/scheduled for cystoscopy. Dr. Warren saw pt in PreOp. Pt decided to not have the procedure. He would like to pursue more conservative methods. Dr. Arrieta was contacted via [employer], Dr. Warren placed a note in Epic/with prescription and dc instructions, Pt only had toradol in the ER-verified in Epic/and ER NP/pt also stated this information. Pain continues. IV removed by RN, Pt walked out to ER by RN. DC instructions were completed with PreOp RN

## 2023-03-23 NOTE — ASSESSMENT & PLAN NOTE
Associated with transaminitis seen on labs, RUQ US revealed fatty liver  Will be important to minimize alcohol use and maintain a healthy BMI in order to prevent progression

## 2023-03-23 NOTE — H&P
Hospital Medicine History & Physical Note    Date of Service  3/23/2023    Primary Care Physician  Daylin Kendrick M.D.    Consultants  urology    Specialist Names: Kelvin    Code Status  Full Code    Chief Complaint  Chief Complaint   Patient presents with    Flank Pain     L flank pain- dx with kidney stone approx 12 days ago. Reports pain is worsening and also experiencing chills and nausea.       History of Presenting Illness  Pantera Connelly is a 32 y.o. male who presents with left flank pain.  Pain started about 12 days ago.  Initially had pain with urination and hematuria.  Hematuria and dysuria has resolved but patient has persistent left flank pain which is now severe.  He also endorsed some chills.  He has not had fever.  He feels nauseous but has not been vomiting.  Normal bowel movements.  Also reports being athletic which would explain his bradycardia.    In the ED labs were benign for signs of infection.  Of note his liver enzymes were elevated with a 2:1 hepatocellular pattern.  But this is very mild.  Alk phos and total bili were also very mildly elevated.    X-ray of abdomen identified 4 x 6 mm ureteral calculus.  Urology was consulted and Dr. Warren will be performing lithotripsy today.  Patient will be admitted and maintained n.p.o. pending procedure for pain management    I discussed the plan of care with patient.    Review of Systems  Review of Systems   Constitutional:  Positive for chills.   HENT: Negative.     Eyes: Negative.    Respiratory: Negative.     Cardiovascular: Negative.    Gastrointestinal:  Positive for nausea.   Genitourinary:  Positive for dysuria, flank pain and hematuria.   Skin: Negative.    Neurological: Negative.    Endo/Heme/Allergies: Negative.    Psychiatric/Behavioral: Negative.     All other systems reviewed and are negative.    Past Medical History   has a past medical history of Heart burn, Indigestion, and Pneumonia.    Surgical History   has a past  surgical history that includes other; septoplasty (8/13/2012); turbinoplasty (8/13/2012); and nasal fracture reduction open (8/13/2012).     Family History  family history includes Diabetes in his father and mother; Hypertension in his father and mother; Lung Disease in his sister.   Family history reviewed with patient. There is no family history that is pertinent to the chief complaint.     Social History   reports that he has never smoked. He has never used smokeless tobacco. He reports that he does not currently use alcohol. He reports that he does not use drugs.    Allergies  Allergies   Allergen Reactions    Other Drug Unspecified     'ANTIBIOTIC,NAME UNKNOWN,CAUSES RASH when he was 8 years old    [place] Unspecified     Unknown reaction         Medications  Prior to Admission Medications   Prescriptions Last Dose Informant Patient Reported? Taking?   acetaminophen (TYLENOL) 500 MG Tab   Yes No   Sig: Take 500-1,000 mg by mouth every 6 hours as needed.   ibuprofen (MOTRIN) 200 MG Tab 3/23/2023 at 0500  Yes No   Sig: Take 400 mg by mouth every 6 hours as needed (pain).   predniSONE (DELTASONE) 20 MG Tab   No No   Sig: Take 1 tab twice daily for 5 days.   vitamin D2, Ergocalciferol, (DRISDOL) 1.25 MG (65540 UT) Cap capsule   Yes No   Sig: Take 50,000 Units by mouth every 7 days.      Facility-Administered Medications: None       Physical Exam  Temp:  [35.9 °C (96.6 °F)] 35.9 °C (96.6 °F)  Pulse:  [54-77] 65  Resp:  [20] 20  BP: (109-142)/(54-84) 111/59  SpO2:  [96 %-99 %] 98 %  Blood Pressure: 109/54   Temperature: 35.9 °C (96.6 °F)   Pulse: (!) 54   Respiration: 20   Pulse Oximetry: 99 %       Physical Exam  Vitals and nursing note reviewed.   Constitutional:       General: He is not in acute distress.     Appearance: Normal appearance. He is not toxic-appearing.   HENT:      Head: Normocephalic and atraumatic.   Eyes:      Extraocular Movements: Extraocular movements intact.      Pupils: Pupils are equal,  round, and reactive to light.   Cardiovascular:      Rate and Rhythm: Normal rate and regular rhythm.      Pulses: Normal pulses.      Heart sounds: Normal heart sounds.   Pulmonary:      Effort: Pulmonary effort is normal.      Breath sounds: Normal breath sounds. No wheezing, rhonchi or rales.   Abdominal:      General: Abdomen is flat. Bowel sounds are normal.      Tenderness: There is left CVA tenderness. There is no guarding or rebound.   Musculoskeletal:         General: Normal range of motion.      Cervical back: Normal range of motion.   Skin:     General: Skin is warm and dry.   Neurological:      General: No focal deficit present.      Mental Status: He is alert and oriented to person, place, and time.   Psychiatric:         Mood and Affect: Mood normal.         Behavior: Behavior normal.       Laboratory:  Recent Labs     03/23/23  1141   WBC 5.9   RBC 4.92   HEMOGLOBIN 14.5   HEMATOCRIT 41.8*   MCV 85.0   MCH 29.5   MCHC 34.7   RDW 38.9   PLATELETCT 179   MPV 10.9     Recent Labs     03/23/23  1141   SODIUM 135   POTASSIUM 4.2   CHLORIDE 101   CO2 22   GLUCOSE 112*   BUN 9   CREATININE 0.98   CALCIUM 9.3     Recent Labs     03/23/23  1141   ALTSGPT 89*   ASTSGOT 55*   ALKPHOSPHAT 113*   TBILIRUBIN 1.6*   GLUCOSE 112*         No results for input(s): NTPROBNP in the last 72 hours.      No results for input(s): TROPONINT in the last 72 hours.    Imaging:  US-RUQ   Final Result      Hepatomegaly and steatosis with gallbladder fossa focal fatty sparing      [id number] VIEW   Final Result      Left 4 x 6 mm ureteral calculus is seen at the inferior L4 level indicating mild distal progression      DX-PORTABLE FLUOROSCOPY < 1 HOUR    (Results Pending)   [id number] VIEW    (Results Pending)       no X-Ray or EKG requiring interpretation    Assessment/Plan:  Justification for Admission Status  I anticipate this patient is appropriate for observation status at this time because ureteral kidney stone.      Patient will need a Med/Surg bed on MEDICAL service .  The need is secondary to pending surgical procedure.    * Kidney stone- (present on admission)  Assessment & Plan  4 X 6 mm left kidney stone   Plan for cystoscopy today with Dr. Warren  D5 1/2 at 125  Pain management with Toradol or low-dose opiate protocol  No signs of infection in labs/UA will defer empiric antibiotics at this time.  N.p.o. for procedure      Fatty liver  Assessment & Plan  Associated with transaminitis seen on labs, RUQ US revealed fatty liver  Will be important to minimize alcohol use and maintain a healthy BMI in order to prevent progression        VTE prophylaxis: enoxaparin ppx

## 2023-03-23 NOTE — ASSESSMENT & PLAN NOTE
4 X 6 mm left kidney stone   Plan for cystoscopy today with Dr. Warren  D5 1/2 at 125  Pain management with Toradol or low-dose opiate protocol  No signs of infection in labs/UA will defer empiric antibiotics at this time.  N.p.o. for procedure

## 2023-03-24 ENCOUNTER — APPOINTMENT (OUTPATIENT)
Dept: RADIOLOGY | Facility: MEDICAL CENTER | Age: 33
End: 2023-03-24
Attending: STUDENT IN AN ORGANIZED HEALTH CARE EDUCATION/TRAINING PROGRAM
Payer: COMMERCIAL

## 2023-03-24 ENCOUNTER — HOSPITAL ENCOUNTER (OUTPATIENT)
Facility: MEDICAL CENTER | Age: 33
End: 2023-03-25
Attending: EMERGENCY MEDICINE | Admitting: HOSPITALIST
Payer: COMMERCIAL

## 2023-03-24 ENCOUNTER — ANESTHESIA EVENT (OUTPATIENT)
Dept: SURGERY | Facility: MEDICAL CENTER | Age: 33
End: 2023-03-24
Payer: COMMERCIAL

## 2023-03-24 ENCOUNTER — ANESTHESIA (OUTPATIENT)
Dept: SURGERY | Facility: MEDICAL CENTER | Age: 33
End: 2023-03-24
Payer: COMMERCIAL

## 2023-03-24 DIAGNOSIS — N20.0 KIDNEY STONE: ICD-10-CM

## 2023-03-24 DIAGNOSIS — N13.9 OBSTRUCTIVE UROPATHY: ICD-10-CM

## 2023-03-24 LAB
ALBUMIN SERPL BCP-MCNC: 4.3 G/DL (ref 3.2–4.9)
ALBUMIN/GLOB SERPL: 1.3 G/DL
ALP SERPL-CCNC: 110 U/L (ref 30–99)
ALT SERPL-CCNC: 91 U/L (ref 2–50)
ANION GAP SERPL CALC-SCNC: 11 MMOL/L (ref 7–16)
APPEARANCE UR: CLEAR
AST SERPL-CCNC: 47 U/L (ref 12–45)
BACTERIA #/AREA URNS HPF: NEGATIVE /HPF
BASOPHILS # BLD AUTO: 1.7 % (ref 0–1.8)
BASOPHILS # BLD: 0.11 K/UL (ref 0–0.12)
BILIRUB SERPL-MCNC: 1.6 MG/DL (ref 0.1–1.5)
BILIRUB UR QL STRIP.AUTO: NEGATIVE
BUN SERPL-MCNC: 10 MG/DL (ref 8–22)
CALCIUM ALBUM COR SERPL-MCNC: 9.1 MG/DL (ref 8.5–10.5)
CALCIUM SERPL-MCNC: 9.3 MG/DL (ref 8.5–10.5)
CHLORIDE SERPL-SCNC: 108 MMOL/L (ref 96–112)
CO2 SERPL-SCNC: 20 MMOL/L (ref 20–33)
COLOR UR: YELLOW
CREAT SERPL-MCNC: 0.93 MG/DL (ref 0.5–1.4)
EOSINOPHIL # BLD AUTO: 0 K/UL (ref 0–0.51)
EOSINOPHIL NFR BLD: 0 % (ref 0–6.9)
EPI CELLS #/AREA URNS HPF: NEGATIVE /HPF
ERYTHROCYTE [DISTWIDTH] IN BLOOD BY AUTOMATED COUNT: 38.8 FL (ref 35.9–50)
GFR SERPLBLD CREATININE-BSD FMLA CKD-EPI: 112 ML/MIN/1.73 M 2
GLOBULIN SER CALC-MCNC: 3.3 G/DL (ref 1.9–3.5)
GLUCOSE SERPL-MCNC: 104 MG/DL (ref 65–99)
GLUCOSE UR STRIP.AUTO-MCNC: NEGATIVE MG/DL
HCT VFR BLD AUTO: 40.4 % (ref 42–52)
HGB BLD-MCNC: 14.1 G/DL (ref 14–18)
HYALINE CASTS #/AREA URNS LPF: ABNORMAL /LPF
KETONES UR STRIP.AUTO-MCNC: NEGATIVE MG/DL
LEUKOCYTE ESTERASE UR QL STRIP.AUTO: NEGATIVE
LYMPHOCYTES # BLD AUTO: 3.34 K/UL (ref 1–4.8)
LYMPHOCYTES NFR BLD: 53.8 % (ref 22–41)
MANUAL DIFF BLD: NORMAL
MCH RBC QN AUTO: 29.1 PG (ref 27–33)
MCHC RBC AUTO-ENTMCNC: 34.9 G/DL (ref 33.7–35.3)
MCV RBC AUTO: 83.5 FL (ref 81.4–97.8)
MICRO URNS: ABNORMAL
MONOCYTES # BLD AUTO: 0.53 K/UL (ref 0–0.85)
MONOCYTES NFR BLD AUTO: 8.6 % (ref 0–13.4)
MORPHOLOGY BLD-IMP: NORMAL
NEUTROPHILS # BLD AUTO: 2.23 K/UL (ref 1.82–7.42)
NEUTROPHILS NFR BLD: 35.9 % (ref 44–72)
NITRITE UR QL STRIP.AUTO: NEGATIVE
NRBC # BLD AUTO: 0 K/UL
NRBC BLD-RTO: 0 /100 WBC
PH UR STRIP.AUTO: 8 [PH] (ref 5–8)
PLATELET # BLD AUTO: 173 K/UL (ref 164–446)
PLATELET BLD QL SMEAR: NORMAL
PMV BLD AUTO: 10.6 FL (ref 9–12.9)
POTASSIUM SERPL-SCNC: 4.2 MMOL/L (ref 3.6–5.5)
PROT SERPL-MCNC: 7.6 G/DL (ref 6–8.2)
PROT UR QL STRIP: NEGATIVE MG/DL
RBC # BLD AUTO: 4.84 M/UL (ref 4.7–6.1)
RBC # URNS HPF: ABNORMAL /HPF
RBC BLD AUTO: PRESENT
RBC UR QL AUTO: ABNORMAL
SMUDGE CELLS BLD QL SMEAR: NORMAL
SODIUM SERPL-SCNC: 139 MMOL/L (ref 135–145)
SP GR UR STRIP.AUTO: 1.02
UROBILINOGEN UR STRIP.AUTO-MCNC: 1 MG/DL
WBC # BLD AUTO: 6.2 K/UL (ref 4.8–10.8)
WBC #/AREA URNS HPF: ABNORMAL /HPF

## 2023-03-24 PROCEDURE — 700111 HCHG RX REV CODE 636 W/ 250 OVERRIDE (IP): Performed by: EMERGENCY MEDICINE

## 2023-03-24 PROCEDURE — 700111 HCHG RX REV CODE 636 W/ 250 OVERRIDE (IP): Performed by: ANESTHESIOLOGY

## 2023-03-24 PROCEDURE — 160009 HCHG ANES TIME/MIN: Performed by: STUDENT IN AN ORGANIZED HEALTH CARE EDUCATION/TRAINING PROGRAM

## 2023-03-24 PROCEDURE — 00910 ANES TRANSURETHRAL PX NOS: CPT | Performed by: ANESTHESIOLOGY

## 2023-03-24 PROCEDURE — 700102 HCHG RX REV CODE 250 W/ 637 OVERRIDE(OP): Performed by: STUDENT IN AN ORGANIZED HEALTH CARE EDUCATION/TRAINING PROGRAM

## 2023-03-24 PROCEDURE — 160036 HCHG PACU - EA ADDL 30 MINS PHASE I: Performed by: STUDENT IN AN ORGANIZED HEALTH CARE EDUCATION/TRAINING PROGRAM

## 2023-03-24 PROCEDURE — 36415 COLL VENOUS BLD VENIPUNCTURE: CPT

## 2023-03-24 PROCEDURE — 99222 1ST HOSP IP/OBS MODERATE 55: CPT | Performed by: HOSPITALIST

## 2023-03-24 PROCEDURE — 160048 HCHG OR STATISTICAL LEVEL 1-5: Performed by: STUDENT IN AN ORGANIZED HEALTH CARE EDUCATION/TRAINING PROGRAM

## 2023-03-24 PROCEDURE — 99285 EMERGENCY DEPT VISIT HI MDM: CPT

## 2023-03-24 PROCEDURE — 80053 COMPREHEN METABOLIC PANEL: CPT

## 2023-03-24 PROCEDURE — 700111 HCHG RX REV CODE 636 W/ 250 OVERRIDE (IP): Performed by: HOSPITALIST

## 2023-03-24 PROCEDURE — 160002 HCHG RECOVERY MINUTES (STAT): Performed by: STUDENT IN AN ORGANIZED HEALTH CARE EDUCATION/TRAINING PROGRAM

## 2023-03-24 PROCEDURE — 96375 TX/PRO/DX INJ NEW DRUG ADDON: CPT

## 2023-03-24 PROCEDURE — G0378 HOSPITAL OBSERVATION PER HR: HCPCS

## 2023-03-24 PROCEDURE — 700105 HCHG RX REV CODE 258: Performed by: EMERGENCY MEDICINE

## 2023-03-24 PROCEDURE — 85025 COMPLETE CBC W/AUTO DIFF WBC: CPT

## 2023-03-24 PROCEDURE — 160039 HCHG SURGERY MINUTES - EA ADDL 1 MIN LEVEL 3: Performed by: STUDENT IN AN ORGANIZED HEALTH CARE EDUCATION/TRAINING PROGRAM

## 2023-03-24 PROCEDURE — 81001 URINALYSIS AUTO W/SCOPE: CPT

## 2023-03-24 PROCEDURE — 700101 HCHG RX REV CODE 250: Performed by: STUDENT IN AN ORGANIZED HEALTH CARE EDUCATION/TRAINING PROGRAM

## 2023-03-24 PROCEDURE — 160035 HCHG PACU - 1ST 60 MINS PHASE I: Performed by: STUDENT IN AN ORGANIZED HEALTH CARE EDUCATION/TRAINING PROGRAM

## 2023-03-24 PROCEDURE — C1769 GUIDE WIRE: HCPCS | Performed by: STUDENT IN AN ORGANIZED HEALTH CARE EDUCATION/TRAINING PROGRAM

## 2023-03-24 PROCEDURE — 700101 HCHG RX REV CODE 250: Performed by: ANESTHESIOLOGY

## 2023-03-24 PROCEDURE — A9270 NON-COVERED ITEM OR SERVICE: HCPCS | Performed by: STUDENT IN AN ORGANIZED HEALTH CARE EDUCATION/TRAINING PROGRAM

## 2023-03-24 PROCEDURE — A9270 NON-COVERED ITEM OR SERVICE: HCPCS | Performed by: ANESTHESIOLOGY

## 2023-03-24 PROCEDURE — 96374 THER/PROPH/DIAG INJ IV PUSH: CPT

## 2023-03-24 PROCEDURE — 160028 HCHG SURGERY MINUTES - 1ST 30 MINS LEVEL 3: Performed by: STUDENT IN AN ORGANIZED HEALTH CARE EDUCATION/TRAINING PROGRAM

## 2023-03-24 PROCEDURE — 700102 HCHG RX REV CODE 250 W/ 637 OVERRIDE(OP): Performed by: ANESTHESIOLOGY

## 2023-03-24 PROCEDURE — 700105 HCHG RX REV CODE 258: Performed by: HOSPITALIST

## 2023-03-24 PROCEDURE — 85007 BL SMEAR W/DIFF WBC COUNT: CPT

## 2023-03-24 PROCEDURE — C2617 STENT, NON-COR, TEM W/O DEL: HCPCS | Performed by: STUDENT IN AN ORGANIZED HEALTH CARE EDUCATION/TRAINING PROGRAM

## 2023-03-24 DEVICE — STENT UROLOGICAL POLARIS 6X26  ULTRA: Type: IMPLANTABLE DEVICE | Site: BLADDER | Status: FUNCTIONAL

## 2023-03-24 RX ORDER — OXYCODONE HCL 5 MG/5 ML
5 SOLUTION, ORAL ORAL
Status: COMPLETED | OUTPATIENT
Start: 2023-03-24 | End: 2023-03-24

## 2023-03-24 RX ORDER — PROCHLORPERAZINE EDISYLATE 5 MG/ML
5-10 INJECTION INTRAMUSCULAR; INTRAVENOUS EVERY 4 HOURS PRN
Status: DISCONTINUED | OUTPATIENT
Start: 2023-03-24 | End: 2023-03-25 | Stop reason: HOSPADM

## 2023-03-24 RX ORDER — MIDAZOLAM HYDROCHLORIDE 1 MG/ML
INJECTION INTRAMUSCULAR; INTRAVENOUS PRN
Status: DISCONTINUED | OUTPATIENT
Start: 2023-03-24 | End: 2023-03-24 | Stop reason: SURG

## 2023-03-24 RX ORDER — OXYCODONE HYDROCHLORIDE 5 MG/1
5 TABLET ORAL EVERY 4 HOURS PRN
Status: DISCONTINUED | OUTPATIENT
Start: 2023-03-24 | End: 2023-03-25

## 2023-03-24 RX ORDER — MEPERIDINE HYDROCHLORIDE 25 MG/ML
6.25 INJECTION INTRAMUSCULAR; INTRAVENOUS; SUBCUTANEOUS
Status: DISCONTINUED | OUTPATIENT
Start: 2023-03-24 | End: 2023-03-24 | Stop reason: HOSPADM

## 2023-03-24 RX ORDER — ONDANSETRON 2 MG/ML
4 INJECTION INTRAMUSCULAR; INTRAVENOUS EVERY 4 HOURS PRN
Status: DISCONTINUED | OUTPATIENT
Start: 2023-03-24 | End: 2023-03-25 | Stop reason: HOSPADM

## 2023-03-24 RX ORDER — LIDOCAINE HYDROCHLORIDE 20 MG/ML
JELLY TOPICAL
Status: DISCONTINUED | OUTPATIENT
Start: 2023-03-24 | End: 2023-03-24 | Stop reason: HOSPADM

## 2023-03-24 RX ORDER — SODIUM CHLORIDE 9 MG/ML
1000 INJECTION, SOLUTION INTRAVENOUS ONCE
Status: COMPLETED | OUTPATIENT
Start: 2023-03-24 | End: 2023-03-24

## 2023-03-24 RX ORDER — HYDROMORPHONE HYDROCHLORIDE 1 MG/ML
0.4 INJECTION, SOLUTION INTRAMUSCULAR; INTRAVENOUS; SUBCUTANEOUS
Status: DISCONTINUED | OUTPATIENT
Start: 2023-03-24 | End: 2023-03-24 | Stop reason: HOSPADM

## 2023-03-24 RX ORDER — ONDANSETRON 4 MG/1
4 TABLET, ORALLY DISINTEGRATING ORAL EVERY 4 HOURS PRN
Status: DISCONTINUED | OUTPATIENT
Start: 2023-03-24 | End: 2023-03-25 | Stop reason: HOSPADM

## 2023-03-24 RX ORDER — BISACODYL 10 MG
10 SUPPOSITORY, RECTAL RECTAL
Status: DISCONTINUED | OUTPATIENT
Start: 2023-03-24 | End: 2023-03-25 | Stop reason: HOSPADM

## 2023-03-24 RX ORDER — SODIUM CHLORIDE, SODIUM LACTATE, POTASSIUM CHLORIDE, CALCIUM CHLORIDE 600; 310; 30; 20 MG/100ML; MG/100ML; MG/100ML; MG/100ML
INJECTION, SOLUTION INTRAVENOUS CONTINUOUS
Status: DISCONTINUED | OUTPATIENT
Start: 2023-03-24 | End: 2023-03-24 | Stop reason: HOSPADM

## 2023-03-24 RX ORDER — CEFAZOLIN SODIUM 1 G/3ML
INJECTION, POWDER, FOR SOLUTION INTRAMUSCULAR; INTRAVENOUS PRN
Status: DISCONTINUED | OUTPATIENT
Start: 2023-03-24 | End: 2023-03-24 | Stop reason: SURG

## 2023-03-24 RX ORDER — HALOPERIDOL 5 MG/ML
1 INJECTION INTRAMUSCULAR
Status: DISCONTINUED | OUTPATIENT
Start: 2023-03-24 | End: 2023-03-24 | Stop reason: HOSPADM

## 2023-03-24 RX ORDER — KETOROLAC TROMETHAMINE 30 MG/ML
30 INJECTION, SOLUTION INTRAMUSCULAR; INTRAVENOUS ONCE
Status: COMPLETED | OUTPATIENT
Start: 2023-03-24 | End: 2023-03-24

## 2023-03-24 RX ORDER — HYDROMORPHONE HYDROCHLORIDE 1 MG/ML
0.1 INJECTION, SOLUTION INTRAMUSCULAR; INTRAVENOUS; SUBCUTANEOUS
Status: DISCONTINUED | OUTPATIENT
Start: 2023-03-24 | End: 2023-03-24 | Stop reason: HOSPADM

## 2023-03-24 RX ORDER — LIDOCAINE HYDROCHLORIDE 20 MG/ML
INJECTION, SOLUTION EPIDURAL; INFILTRATION; INTRACAUDAL; PERINEURAL PRN
Status: DISCONTINUED | OUTPATIENT
Start: 2023-03-24 | End: 2023-03-24 | Stop reason: SURG

## 2023-03-24 RX ORDER — OXYCODONE HCL 5 MG/5 ML
10 SOLUTION, ORAL ORAL
Status: COMPLETED | OUTPATIENT
Start: 2023-03-24 | End: 2023-03-24

## 2023-03-24 RX ORDER — DIPHENHYDRAMINE HYDROCHLORIDE 50 MG/ML
12.5 INJECTION INTRAMUSCULAR; INTRAVENOUS
Status: DISCONTINUED | OUTPATIENT
Start: 2023-03-24 | End: 2023-03-24 | Stop reason: HOSPADM

## 2023-03-24 RX ORDER — POLYETHYLENE GLYCOL 3350 17 G/17G
1 POWDER, FOR SOLUTION ORAL
Status: DISCONTINUED | OUTPATIENT
Start: 2023-03-24 | End: 2023-03-25 | Stop reason: HOSPADM

## 2023-03-24 RX ORDER — HYDROMORPHONE HYDROCHLORIDE 1 MG/ML
0.2 INJECTION, SOLUTION INTRAMUSCULAR; INTRAVENOUS; SUBCUTANEOUS
Status: DISCONTINUED | OUTPATIENT
Start: 2023-03-24 | End: 2023-03-24 | Stop reason: HOSPADM

## 2023-03-24 RX ORDER — AMOXICILLIN 250 MG
2 CAPSULE ORAL 2 TIMES DAILY
Status: DISCONTINUED | OUTPATIENT
Start: 2023-03-24 | End: 2023-03-25 | Stop reason: HOSPADM

## 2023-03-24 RX ORDER — SODIUM CHLORIDE 9 MG/ML
INJECTION, SOLUTION INTRAVENOUS CONTINUOUS
Status: DISCONTINUED | OUTPATIENT
Start: 2023-03-24 | End: 2023-03-25 | Stop reason: HOSPADM

## 2023-03-24 RX ORDER — KETOROLAC TROMETHAMINE 30 MG/ML
15 INJECTION, SOLUTION INTRAMUSCULAR; INTRAVENOUS EVERY 6 HOURS PRN
Status: DISCONTINUED | OUTPATIENT
Start: 2023-03-24 | End: 2023-03-25

## 2023-03-24 RX ORDER — ONDANSETRON 2 MG/ML
4 INJECTION INTRAMUSCULAR; INTRAVENOUS
Status: DISCONTINUED | OUTPATIENT
Start: 2023-03-24 | End: 2023-03-24 | Stop reason: HOSPADM

## 2023-03-24 RX ORDER — PROMETHAZINE HYDROCHLORIDE 12.5 MG/1
12.5-25 SUPPOSITORY RECTAL EVERY 4 HOURS PRN
Status: DISCONTINUED | OUTPATIENT
Start: 2023-03-24 | End: 2023-03-25 | Stop reason: HOSPADM

## 2023-03-24 RX ORDER — PROMETHAZINE HYDROCHLORIDE 25 MG/1
12.5-25 TABLET ORAL EVERY 4 HOURS PRN
Status: DISCONTINUED | OUTPATIENT
Start: 2023-03-24 | End: 2023-03-25 | Stop reason: HOSPADM

## 2023-03-24 RX ADMIN — SODIUM CHLORIDE: 9 INJECTION, SOLUTION INTRAVENOUS at 14:54

## 2023-03-24 RX ADMIN — HYDROMORPHONE HYDROCHLORIDE 0.2 MG: 1 INJECTION, SOLUTION INTRAMUSCULAR; INTRAVENOUS; SUBCUTANEOUS at 17:38

## 2023-03-24 RX ADMIN — HYDROMORPHONE HYDROCHLORIDE 0.4 MG: 1 INJECTION, SOLUTION INTRAMUSCULAR; INTRAVENOUS; SUBCUTANEOUS at 17:28

## 2023-03-24 RX ADMIN — LIDOCAINE HYDROCHLORIDE 100 MG: 20 INJECTION, SOLUTION EPIDURAL; INFILTRATION; INTRACAUDAL at 16:36

## 2023-03-24 RX ADMIN — FENTANYL CITRATE 100 MCG: 50 INJECTION, SOLUTION INTRAMUSCULAR; INTRAVENOUS at 16:32

## 2023-03-24 RX ADMIN — PROPOFOL 200 MG: 10 INJECTION, EMULSION INTRAVENOUS at 16:36

## 2023-03-24 RX ADMIN — MIDAZOLAM HYDROCHLORIDE 2 MG: 1 INJECTION, SOLUTION INTRAMUSCULAR; INTRAVENOUS at 16:32

## 2023-03-24 RX ADMIN — HYDROMORPHONE HYDROCHLORIDE 0.4 MG: 1 INJECTION, SOLUTION INTRAMUSCULAR; INTRAVENOUS; SUBCUTANEOUS at 17:58

## 2023-03-24 RX ADMIN — MEPERIDINE HYDROCHLORIDE 6.25 MG: 25 INJECTION INTRAMUSCULAR; INTRAVENOUS; SUBCUTANEOUS at 18:07

## 2023-03-24 RX ADMIN — PROCHLORPERAZINE EDISYLATE 10 MG: 5 INJECTION INTRAMUSCULAR; INTRAVENOUS at 20:13

## 2023-03-24 RX ADMIN — FENTANYL CITRATE 50 MCG: 50 INJECTION, SOLUTION INTRAMUSCULAR; INTRAVENOUS at 17:25

## 2023-03-24 RX ADMIN — SODIUM CHLORIDE 1000 ML: 9 INJECTION, SOLUTION INTRAVENOUS at 12:56

## 2023-03-24 RX ADMIN — OXYCODONE HYDROCHLORIDE 10 MG: 5 SOLUTION ORAL at 17:25

## 2023-03-24 RX ADMIN — CEFAZOLIN 2 G: 330 INJECTION, POWDER, FOR SOLUTION INTRAMUSCULAR; INTRAVENOUS at 16:42

## 2023-03-24 RX ADMIN — KETOROLAC TROMETHAMINE 15 MG: 30 INJECTION, SOLUTION INTRAMUSCULAR at 18:21

## 2023-03-24 RX ADMIN — HYDROMORPHONE HYDROCHLORIDE 0.4 MG: 1 INJECTION, SOLUTION INTRAMUSCULAR; INTRAVENOUS; SUBCUTANEOUS at 17:48

## 2023-03-24 RX ADMIN — HYDROMORPHONE HYDROCHLORIDE 0.2 MG: 1 INJECTION, SOLUTION INTRAMUSCULAR; INTRAVENOUS; SUBCUTANEOUS at 18:05

## 2023-03-24 RX ADMIN — FENTANYL CITRATE 50 MCG: 50 INJECTION, SOLUTION INTRAMUSCULAR; INTRAVENOUS at 18:17

## 2023-03-24 RX ADMIN — HYDROMORPHONE HYDROCHLORIDE 0.4 MG: 1 INJECTION, SOLUTION INTRAMUSCULAR; INTRAVENOUS; SUBCUTANEOUS at 17:21

## 2023-03-24 RX ADMIN — MEPERIDINE HYDROCHLORIDE 6.25 MG: 25 INJECTION INTRAMUSCULAR; INTRAVENOUS; SUBCUTANEOUS at 17:57

## 2023-03-24 RX ADMIN — OXYCODONE HYDROCHLORIDE 5 MG: 5 TABLET ORAL at 20:12

## 2023-03-24 RX ADMIN — FENTANYL CITRATE 50 MCG: 50 INJECTION, SOLUTION INTRAMUSCULAR; INTRAVENOUS at 17:33

## 2023-03-24 RX ADMIN — KETOROLAC TROMETHAMINE 30 MG: 30 INJECTION, SOLUTION INTRAMUSCULAR at 12:54

## 2023-03-24 ASSESSMENT — ENCOUNTER SYMPTOMS
NECK PAIN: 0
COUGH: 0
DEPRESSION: 0
BACK PAIN: 0
EYE PAIN: 0
SHORTNESS OF BREATH: 0
FEVER: 0
PHOTOPHOBIA: 0
FLANK PAIN: 1
TREMORS: 0
VOMITING: 0
DOUBLE VISION: 0
DIZZINESS: 0
HALLUCINATIONS: 0
CHILLS: 0
NAUSEA: 1
SPUTUM PRODUCTION: 0
HEADACHES: 0
MYALGIAS: 0
BLURRED VISION: 0
HEMOPTYSIS: 0

## 2023-03-24 ASSESSMENT — PAIN DESCRIPTION - PAIN TYPE
TYPE: SURGICAL PAIN
TYPE: ACUTE PAIN
TYPE: SURGICAL PAIN

## 2023-03-24 ASSESSMENT — FIBROSIS 4 INDEX
FIB4 SCORE: 0.85
FIB4 SCORE: 1.01

## 2023-03-24 ASSESSMENT — PATIENT HEALTH QUESTIONNAIRE - PHQ9
2. FEELING DOWN, DEPRESSED, IRRITABLE, OR HOPELESS: NOT AT ALL
SUM OF ALL RESPONSES TO PHQ9 QUESTIONS 1 AND 2: 0
1. LITTLE INTEREST OR PLEASURE IN DOING THINGS: NOT AT ALL

## 2023-03-24 NOTE — ANESTHESIA PROCEDURE NOTES
Airway    Date/Time: 3/24/2023 4:46 PM  Performed by: Crispin Dooley M.D.  Authorized by: Crispin Dooley M.D.     Location:  OR  Urgency:  Elective  Indications for Airway Management:  Anesthesia      Spontaneous Ventilation: absent    Sedation Level:  Deep  Preoxygenated: Yes    Final Airway Type:  Supraglottic airway  Final Supraglottic Airway:  Standard LMA    SGA Size:  4  Number of Attempts at Approach:  1

## 2023-03-24 NOTE — ANESTHESIA PREPROCEDURE EVALUATION
Case: [id number] Date/Time: 03/24/23 1723    Procedures:       CYSTOSCOPY (Bladder)      URETEROSCOPY (Bladder)      LITHOTRIPSY, USING LASER (Bladder)    Anesthesia type: General    Location: [place] [address] / SURGERY [facility]    Surgeons: Dustin Millard M.D.          Relevant Problems      (positive) Fatty liver   (positive) Kidney stone       Physical Exam    Airway   Mallampati: II  TM distance: >3 FB  Neck ROM: full       Cardiovascular - normal exam  Rhythm: regular  Rate: normal  (-) murmur     Dental - normal exam           Pulmonary - normal exam  Breath sounds clear to auscultation     Abdominal    Neurological - normal exam                 Anesthesia Plan    ASA 2       Plan - general       Airway plan will be LMA          Induction: intravenous    Postoperative Plan: Postoperative administration of opioids is intended.    Pertinent diagnostic labs and testing reviewed    Informed Consent:    Anesthetic plan and risks discussed with patient.    Use of blood products discussed with: patient whom consented to blood products.

## 2023-03-24 NOTE — ED TRIAGE NOTES
"Chief Complaint   Patient presents with    Flank Pain     LT flank pain x8 days. Dx'd with kidney stone x10 days ago, was at [facility]  yesterday, was going to have a lithotripsy with a  stent but backed out.   Increased pain and nausea today. Reports a fever last night.      Ambulatory to triage for above.     /79   Pulse 73   Temp 36.1 °C (97 °F) (Temporal)   Resp 18   Ht 1.93 m (6' 4\")   Wt 100 kg (220 lb 7.4 oz)   SpO2 97%   BMI 26.84 kg/m²     "

## 2023-03-24 NOTE — ED NOTES
Report called to Eusebio KEVIN. Pt is aware of plan of care and is agreeable with admission.    10 0

## 2023-03-24 NOTE — ASSESSMENT & PLAN NOTE
IV for hydration  IV Toradol for pain    Zofran for nausea vomiting    Urology has been consulted pending operative report intervention

## 2023-03-24 NOTE — H&P
[facility] [place] Consult/H&P Note    Primary Service: Medicine  Attending: Alpesh Carson M.D.  Patient's Name/MRN: Pantera Connelly, [id number]    Admit Date:3/24/2023  Today's Date: 3/24/2023   Length of stay:  LOS: 0 days   Room #: T210/00      Reason for consult/chief complaint: Left ureteral stone  ID/HPI: Pantera Connelly is a 32 y.o. male patient who presents again to the ED w/ an obstructing left ureteral stone. Pain not well controlled. No infectious symptoms. No prior stone episodes.       Past Medical History:   Past Medical History:   Diagnosis Date    Heart burn     Indigestion     Pneumonia     3 years ago,approx 2009        Past Surgical History:   Past Surgical History:   Procedure Laterality Date    SEPTOPLASTY  8/13/2012    Performed by IJEOMA SMART at [facility]    TURBINOPLASTY  8/13/2012    Performed by IJEOMA SMART at [facility]    NASAL FRACTURE REDUCTION OPEN  8/13/2012    Performed by SHAJI POSADAS at [facility]    OTHER      WISDOM TEETH REMOVED        Family History:   Family History   Problem Relation Age of Onset    Diabetes Mother     Hypertension Mother     Diabetes Father     Hypertension Father     Heart Disease Sister          Social History:   Social History     Tobacco Use    Smoking status: Never    Smokeless tobacco: Never   Vaping Use    Vaping Use: Never used   Substance Use Topics    Alcohol use: Not Currently    Drug use: No      Social History     Social History Narrative    Not on file        Allergies: he Other drug and [place]    Medications:   Medications Prior to Admission   Medication Sig Dispense Refill Last Dose    vitamin D2, Ergocalciferol, (DRISDOL) 1.25 MG (10924 UT) Cap capsule Take 50,000 Units by mouth every 7 days.   3/18/2023 at [facility]    ketorolac (TORADOL) 10 MG Tab Take 1 Tablet by mouth every 6 hours as needed for Moderate Pain. 15 Tablet 0 3/23/2023 at [facility]    tamsulosin  "(FLOMAX) 0.4 MG capsule Take 1 Capsule by mouth every evening. 30 Capsule 1 3/22/2023 at PM    acetaminophen (TYLENOL) 500 MG Tab Take 500-1,000 mg by mouth every 6 hours as needed.   3/23/2023 at AM    ibuprofen (MOTRIN) 200 MG Tab Take 400 mg by mouth every 6 hours as needed (pain).   3/23/2023         Review of Systems  As per HPI     Physical Exam  VITAL SIGNS: /79   Pulse 60   Temp 36.6 °C (97.8 °F) (Temporal)   Resp 18   Ht 1.93 m (6' 4\")   Wt 100 kg (221 lb 1.9 oz)   SpO2 96%   BMI 26.92 kg/m²   Gen: no acute distress  Card: regular rate  Pulm: breathing comfortably     Labs:  Recent Labs     03/23/23  1141 03/24/23  1245   WBC 5.9 6.2   RBC 4.92 4.84   HEMOGLOBIN 14.5 14.1   HEMATOCRIT 41.8* 40.4*   MCV 85.0 83.5   MCH 29.5 29.1   MCHC 34.7 34.9   RDW 38.9 38.8   PLATELETCT 179 173   MPV 10.9 10.6     Recent Labs     03/23/23  1141 03/24/23  1245   SODIUM 135 139   POTASSIUM 4.2 4.2   CHLORIDE 101 108   CO2 22 20   GLUCOSE 112* 104*   BUN 9 10   CREATININE 0.98 0.93   CALCIUM 9.3 9.3         Glucose:  Recent Labs     03/23/23  1141 03/24/23  1245   GLUCOSE 112* 104*     Coags:  No results for input(s): INR in the last 72 hours.      Urinalysis:   Recent Labs     03/24/23  1250   COLORURINE Yellow   CLARITY Clear   SPECGRAVITY 1.017   PHURINE 8.0   GLUCOSEUR Negative   KETONES Negative   NITRITE Negative   OCCULTBLOOD Small*   RBCURINE 10-20*   BACTERIA Negative   EPITHELCELL Negative       Imaging:  CT reviewed.     Assessment/Recommendation   32 y.o. male with obstructing left 4 mm ureteral stone who presents to ED w/ refractory pain. Risks and benefits of left CULTS were discussed and patient agreed to proceed. Consent obtained.    To OR for left CULTS.         "

## 2023-03-24 NOTE — H&P
[facility] Medicine History & Physical Note    Date of Service  3/24/2023    Primary Care Physician  Daylin Kendrick M.D.    Consultants  urology    fregoso    Code Status  Prior    Chief Complaint  Chief Complaint   Patient presents with    Flank Pain     LT flank pain x8 days. Dx'd with kidney stone x10 days ago, was at [facility] yesterday, was going to have a lithotripsy with a  stent but backed out.   Increased pain and nausea today. Reports a fever last night.        History of Presenting Illness  Pantera Connelly is a 32 y.o. male who presented 3/24/2023 with past ministry of panic disorder and vitamin D deficiency who presents to the emergency department complaining of intermittent flank pain on the left side ongoing for the last 10 days.  Patient was diagnosed with a kidney stone on the left and was planned for lithotripsy as there was some evidence of some mild hydronephrosis on the left.  But apparently he panicked yesterday and that he did not want the procedure.  Pain came back last night intensity 7 out of 10 spasm on and off that was improved by IV Toradol.  Patient agreed for the procedure today and urology has been consulted    Nausea but no vomiting    No fever chills shortness of breath or chest pain    I discussed the plan of care with patient.    Review of Systems  Review of Systems   Constitutional:  Negative for chills and fever.   HENT:  Negative for ear discharge, ear pain, hearing loss and tinnitus.    Eyes:  Negative for blurred vision, double vision, photophobia and pain.   Respiratory:  Negative for cough, hemoptysis, sputum production and shortness of breath.    Gastrointestinal:  Positive for nausea. Negative for vomiting.   Genitourinary:  Positive for flank pain. Negative for dysuria, frequency, hematuria and urgency.   Musculoskeletal:  Negative for back pain, myalgias and neck pain.   Neurological:  Negative for dizziness, tremors and headaches.   Psychiatric/Behavioral:  Negative  for depression and hallucinations.    All other systems reviewed and are negative.    Past Medical History   has a past medical history of Heart burn, Indigestion, and Pneumonia.    Surgical History   has a past surgical history that includes other; septoplasty (8/13/2012); turbinoplasty (8/13/2012); and nasal fracture reduction open (8/13/2012).     Family History  family history includes Diabetes in his father and mother; Hypertension in his father and mother; Lung Disease in his sister.   Family history reviewed with patient. There is no family history that is pertinent to the chief complaint.     Social History   reports that he has never smoked. He has never used smokeless tobacco. He reports that he does not currently use alcohol. He reports that he does not use drugs.    Allergies  Allergies   Allergen Reactions    Other Drug Unspecified     'ANTIBIOTIC,NAME UNKNOWN,CAUSES RASH when he was 8 years old    [place] Unspecified     Unknown reaction         Medications  Prior to Admission Medications   Prescriptions Last Dose Informant Patient Reported? Taking?   acetaminophen (TYLENOL) 500 MG Tab 3/23/2023 at AM Patient Yes No   Sig: Take 500-1,000 mg by mouth every 6 hours as needed.   ibuprofen (MOTRIN) 200 MG Tab 3/23/2023 Patient Yes No   Sig: Take 400 mg by mouth every 6 hours as needed (pain).   ketorolac (TORADOL) 10 MG Tab 3/23/2023 at [facility] Patient No No   Sig: Take 1 Tablet by mouth every 6 hours as needed for Moderate Pain.   tamsulosin (FLOMAX) 0.4 MG capsule 3/22/2023 at PM Patient No No   Sig: Take 1 Capsule by mouth every evening.   vitamin D2, Ergocalciferol, (DRISDOL) 1.25 MG (24691 UT) Cap capsule 3/18/2023 at [facility] Patient Yes No   Sig: Take 50,000 Units by mouth every 7 days.      Facility-Administered Medications: None       Physical Exam  Temp:  [36.1 °C (97 °F)-36.6 °C (97.8 °F)] 36.6 °C (97.8 °F)  Pulse:  [50-73] 60  Resp:  [16-19] 18  BP: (121-132)/(69-79) 132/79  SpO2:  [96 %-97 %] 96  %  Blood Pressure: 132/79   Temperature: 36.6 °C (97.8 °F)   Pulse: 60   Respiration: 18   Pulse Oximetry: 96 %       Physical Exam  Constitutional:       General: He is not in acute distress.     Appearance: Normal appearance. He is not ill-appearing or diaphoretic.   HENT:      Head: Normocephalic.      Mouth/Throat:      Mouth: Mucous membranes are moist.   Eyes:      General: No scleral icterus.        Left eye: No discharge.      Extraocular Movements: Extraocular movements intact.      Pupils: Pupils are equal, round, and reactive to light.   Cardiovascular:      Rate and Rhythm: Normal rate and regular rhythm.      Heart sounds: No murmur heard.    No gallop.   Pulmonary:      Effort: Pulmonary effort is normal. No respiratory distress.      Breath sounds: No wheezing or rales.   Abdominal:      General: There is no distension.      Palpations: There is no mass.      Tenderness: There is no abdominal tenderness. There is no left CVA tenderness or guarding.   Musculoskeletal:         General: No swelling or deformity. Normal range of motion.      Cervical back: Normal range of motion. No rigidity.      Right lower leg: No edema.   Lymphadenopathy:      Cervical: No cervical adenopathy.   Skin:     General: Skin is warm.      Capillary Refill: Capillary refill takes 2 to 3 seconds.      Coloration: Skin is not jaundiced.      Findings: No bruising or lesion.   Neurological:      General: No focal deficit present.      Mental Status: He is alert and oriented to person, place, and time.      Cranial Nerves: No cranial nerve deficit.      Motor: No weakness.      Gait: Gait normal.   Psychiatric:         Mood and Affect: Mood normal.       Laboratory:  Recent Labs     03/23/23  1141   WBC 5.9   RBC 4.92   HEMOGLOBIN 14.5   HEMATOCRIT 41.8*   MCV 85.0   MCH 29.5   MCHC 34.7   RDW 38.9   PLATELETCT 179   MPV 10.9     Recent Labs     03/23/23  1141 03/24/23  1245   SODIUM 135 139   POTASSIUM 4.2 4.2   CHLORIDE 101  108   CO2 22 20   GLUCOSE 112* 104*   BUN 9 10   CREATININE 0.98 0.93   CALCIUM 9.3 9.3     Recent Labs     03/23/23  1141 03/24/23  1245   ALTSGPT 89* 91*   ASTSGOT 55* 47*   ALKPHOSPHAT 113* 110*   TBILIRUBIN 1.6* 1.6*   GLUCOSE 112* 104*         No results for input(s): NTPROBNP in the last 72 hours.      No results for input(s): TROPONINT in the last 72 hours.    Imaging:  No orders to display       X-Ray:  I have personally reviewed the images and compared with prior images.    Assessment/Plan:  Justification for Admission Status  I anticipate this patient is appropriate for observation status at this time because I expect patient require less than 48 hours for further evaluation and treatment of his obstructive kidney stone      * Kidney stone- (present on admission)  Assessment & Plan  IV for hydration  IV Toradol for pain    Zofran for nausea vomiting    Urology has been consulted pending operative report intervention    Obstructive uropathy- (present on admission)  Assessment & Plan  For lithotripsy today    IV Toradol for pain    IV fluid hydration    Panic disorder- (present on admission)  Assessment & Plan  Ativan as needed        VTE prophylaxis: SCDs/TEDs

## 2023-03-24 NOTE — ED PROVIDER NOTES
ED Provider Note    CHIEF COMPLAINT  Chief Complaint   Patient presents with    Flank Pain     LT flank pain x8 days. Dx'd with kidney stone x10 days ago, was at [facility] yesterday, was going to have a lithotripsy with a  stent but backed out.   Increased pain and nausea today. Reports a fever last night.        EXTERNAL RECORDS REVIEWED  Seen by primary for hematuria.  Had a CT scan done showing left ureteral stone    HPI/ROS      Pantera Connelly is a 32 y.o. male who presents with left flank pain.  This started over 10 days ago.  Seen by primary doctor.  Identified to have a kidney stone.  Has been in contact with urology.  [facility] yesterday.  Plan for lithotripsy, but the patient was worried and decided to not have this procedure done.  His pain came back last night and therefore returned.  He has had nausea felt hot and cold flashes.    PAST MEDICAL HISTORY   has a past medical history of Heart burn, Indigestion, and Pneumonia.    SURGICAL HISTORY   has a past surgical history that includes other; septoplasty (8/13/2012); turbinoplasty (8/13/2012); and nasal fracture reduction open (8/13/2012).    FAMILY HISTORY  Family History   Problem Relation Age of Onset    Diabetes Mother     Hypertension Mother     Diabetes Father     Hypertension Father     Lung Disease Sister        SOCIAL HISTORY  Social History     Tobacco Use    Smoking status: Never    Smokeless tobacco: Never   Vaping Use    Vaping Use: Never used   Substance and Sexual Activity    Alcohol use: Not Currently    Drug use: No    Sexual activity: Not on file       CURRENT MEDICATIONS  Home Medications       Reviewed by Elisabeth Granados (Pharmacy Tech) on 03/24/23 at 1348  Med List Status: Complete     Medication Last Dose Status   acetaminophen (TYLENOL) 500 MG Tab 3/23/2023 Active   ibuprofen (MOTRIN) 200 MG Tab 3/23/2023 Active   ketorolac (TORADOL) 10 MG Tab 3/23/2023 Active   tamsulosin (FLOMAX) 0.4 MG capsule 3/22/2023 Active  "  vitamin D2, Ergocalciferol, (DRISDOL) 1.25 MG (34887 UT) Cap capsule 3/18/2023 Active                    ALLERGIES  Allergies   Allergen Reactions    Other Drug Unspecified     'ANTIBIOTIC,NAME UNKNOWN,CAUSES RASH when he was 8 years old    [place] Unspecified     Unknown reaction         PHYSICAL EXAM  VITAL SIGNS: /69   Pulse (!) 58   Temp 36.1 °C (97 °F) (Temporal)   Resp 19   Ht 1.93 m (6' 4\")   Wt 100 kg (220 lb 7.4 oz)   SpO2 96%   BMI 26.84 kg/m²    Constitutional: Awake and alert  HENT: Normal inspection  Eyes: Normal inspection  Neck: Grossly normal range of motion.  Cardiovascular: Normal heart rate  Thorax & Lungs: No respiratory distress  Extremities: Well perfused  Neurologic: Grossly normal   Psychiatric: Normal for situation      DIAGNOSTIC STUDIES / PROCEDURES      LABS  Results for orders placed or performed during the hospital encounter of 03/24/23   COMP METABOLIC PANEL   Result Value Ref Range    Sodium 139 135 - 145 mmol/L    Potassium 4.2 3.6 - 5.5 mmol/L    Chloride 108 96 - 112 mmol/L    Co2 20 20 - 33 mmol/L    Anion Gap 11.0 7.0 - 16.0    Glucose 104 (H) 65 - 99 mg/dL    Bun 10 8 - 22 mg/dL    Creatinine 0.93 0.50 - 1.40 mg/dL    Calcium 9.3 8.5 - 10.5 mg/dL    AST(SGOT) 47 (H) 12 - 45 U/L    ALT(SGPT) 91 (H) 2 - 50 U/L    Alkaline Phosphatase 110 (H) 30 - 99 U/L    Total Bilirubin 1.6 (H) 0.1 - 1.5 mg/dL    Albumin 4.3 3.2 - 4.9 g/dL    Total Protein 7.6 6.0 - 8.2 g/dL    Globulin 3.3 1.9 - 3.5 g/dL    A-G Ratio 1.3 g/dL   URINALYSIS CULTURE, IF INDICATED    Specimen: Urine, Clean Catch; Blood   Result Value Ref Range    Color Yellow     Character Clear     Specific Gravity 1.017 <1.035    Ph 8.0 5.0 - 8.0    Glucose Negative Negative mg/dL    Ketones Negative Negative mg/dL    Protein Negative Negative mg/dL    Bilirubin Negative Negative    Urobilinogen, Urine 1.0 Negative    Nitrite Negative Negative    Leukocyte Esterase Negative Negative    Occult Blood Small (A) " Negative    Micro Urine Req Microscopic    URINE MICROSCOPIC (W/UA)   Result Value Ref Range    WBC 0-2 (A) /hpf    RBC 10-20 (A) /hpf    Bacteria Negative None /hpf    Epithelial Cells Negative /hpf    Hyaline Cast 0-2 /lpf   CORRECTED CALCIUM   Result Value Ref Range    Correct Calcium 9.1 8.5 - 10.5 mg/dL   ESTIMATED GFR   Result Value Ref Range    GFR (CKD-EPI) 112 >60 mL/min/1.73 m 2        RADIOLOGY  kUB yesterday shows 6 x 4 mm stone left-sided    COURSE & MEDICAL DECISION MAKING    INITIAL ASSESSMENT, COURSE AND PLAN  Care Narrative: Presents with left flank pain.  Has a kidney stone.  Plan for lithotripsy.  Reports now having fevers.  Ordered screening laboratory data which is returned basically unchanged.  Still await CBC.    Contacted Dr. Millard, urology.  Reviewed the case.  He can take the patient to the operating room today.     Patient can be admitted to floor pending surgery.  Discussed case with Dr. Carson.    HYDRATION: Based on the patient's presentation of Other kidney stone for medical expulsive therapy the patient was given IV fluids. IV Hydration was used because oral hydration failed due to not appropriate considering planned surgery. Upon recheck following hydration, the patient was stable.        DISPOSITION AND DISCUSSIONS  I have discussed management of the patient with the following physicians and SOHAIL's: Dr. Millard, urology; Dr. Carson, hospitalist    Escalation of care considered, and ultimately not performed: Consider repeating imaging but does not appear to be indicated    FINAL DIAGNOSIS  1. Kidney stone           Electronically signed by: Lester Barajas M.D., 3/24/2023 2:09 PM

## 2023-03-25 ENCOUNTER — PHARMACY VISIT (OUTPATIENT)
Dept: PHARMACY | Facility: MEDICAL CENTER | Age: 33
End: 2023-03-25
Payer: COMMERCIAL

## 2023-03-25 VITALS
RESPIRATION RATE: 16 BRPM | HEIGHT: 76 IN | HEART RATE: 77 BPM | SYSTOLIC BLOOD PRESSURE: 116 MMHG | TEMPERATURE: 98 F | DIASTOLIC BLOOD PRESSURE: 61 MMHG | BODY MASS INDEX: 26.93 KG/M2 | WEIGHT: 221.12 LBS | OXYGEN SATURATION: 93 %

## 2023-03-25 PROCEDURE — 700102 HCHG RX REV CODE 250 W/ 637 OVERRIDE(OP): Performed by: HOSPITALIST

## 2023-03-25 PROCEDURE — 99239 HOSP IP/OBS DSCHRG MGMT >30: CPT | Performed by: HOSPITALIST

## 2023-03-25 PROCEDURE — 700111 HCHG RX REV CODE 636 W/ 250 OVERRIDE (IP): Performed by: HOSPITALIST

## 2023-03-25 PROCEDURE — 700102 HCHG RX REV CODE 250 W/ 637 OVERRIDE(OP): Performed by: STUDENT IN AN ORGANIZED HEALTH CARE EDUCATION/TRAINING PROGRAM

## 2023-03-25 PROCEDURE — 96376 TX/PRO/DX INJ SAME DRUG ADON: CPT

## 2023-03-25 PROCEDURE — 96375 TX/PRO/DX INJ NEW DRUG ADDON: CPT

## 2023-03-25 PROCEDURE — A9270 NON-COVERED ITEM OR SERVICE: HCPCS | Performed by: STUDENT IN AN ORGANIZED HEALTH CARE EDUCATION/TRAINING PROGRAM

## 2023-03-25 PROCEDURE — RXMED WILLOW AMBULATORY MEDICATION CHARGE: Performed by: HOSPITALIST

## 2023-03-25 PROCEDURE — A9270 NON-COVERED ITEM OR SERVICE: HCPCS | Performed by: HOSPITALIST

## 2023-03-25 PROCEDURE — G0378 HOSPITAL OBSERVATION PER HR: HCPCS

## 2023-03-25 PROCEDURE — A9270 NON-COVERED ITEM OR SERVICE: HCPCS | Performed by: PHYSICIAN ASSISTANT

## 2023-03-25 PROCEDURE — 700102 HCHG RX REV CODE 250 W/ 637 OVERRIDE(OP): Performed by: PHYSICIAN ASSISTANT

## 2023-03-25 RX ORDER — OXYCODONE HYDROCHLORIDE 5 MG/1
5 TABLET ORAL ONCE
Status: COMPLETED | OUTPATIENT
Start: 2023-03-25 | End: 2023-03-25

## 2023-03-25 RX ORDER — KETOROLAC TROMETHAMINE 30 MG/ML
30 INJECTION, SOLUTION INTRAMUSCULAR; INTRAVENOUS EVERY 6 HOURS PRN
Status: DISCONTINUED | OUTPATIENT
Start: 2023-03-25 | End: 2023-03-25 | Stop reason: HOSPADM

## 2023-03-25 RX ORDER — OXYBUTYNIN CHLORIDE 5 MG/1
5 TABLET ORAL 3 TIMES DAILY
Status: DISCONTINUED | OUTPATIENT
Start: 2023-03-25 | End: 2023-03-25 | Stop reason: HOSPADM

## 2023-03-25 RX ORDER — TAMSULOSIN HYDROCHLORIDE 0.4 MG/1
0.4 CAPSULE ORAL
Status: DISCONTINUED | OUTPATIENT
Start: 2023-03-25 | End: 2023-03-25 | Stop reason: HOSPADM

## 2023-03-25 RX ORDER — HYDROMORPHONE HYDROCHLORIDE 1 MG/ML
0.5 INJECTION, SOLUTION INTRAMUSCULAR; INTRAVENOUS; SUBCUTANEOUS ONCE
Status: COMPLETED | OUTPATIENT
Start: 2023-03-25 | End: 2023-03-25

## 2023-03-25 RX ORDER — OXYBUTYNIN CHLORIDE 5 MG/1
5 TABLET ORAL 3 TIMES DAILY
Qty: 30 TABLET | Refills: 0 | Status: SHIPPED | OUTPATIENT
Start: 2023-03-25 | End: 2023-04-04

## 2023-03-25 RX ORDER — PHENAZOPYRIDINE HYDROCHLORIDE 200 MG/1
200 TABLET, FILM COATED ORAL
Status: DISCONTINUED | OUTPATIENT
Start: 2023-03-25 | End: 2023-03-25 | Stop reason: HOSPADM

## 2023-03-25 RX ORDER — OXYCODONE HYDROCHLORIDE 10 MG/1
10 TABLET ORAL EVERY 6 HOURS PRN
Qty: 28 TABLET | Refills: 0 | Status: SHIPPED | OUTPATIENT
Start: 2023-03-25 | End: 2023-04-01

## 2023-03-25 RX ORDER — PHENAZOPYRIDINE HYDROCHLORIDE 200 MG/1
200 TABLET, FILM COATED ORAL
Qty: 6 TABLET | Refills: 0 | Status: SHIPPED | OUTPATIENT
Start: 2023-03-25

## 2023-03-25 RX ORDER — OXYCODONE HYDROCHLORIDE 10 MG/1
10 TABLET ORAL EVERY 4 HOURS PRN
Status: DISCONTINUED | OUTPATIENT
Start: 2023-03-25 | End: 2023-03-25 | Stop reason: HOSPADM

## 2023-03-25 RX ORDER — ONDANSETRON 4 MG/1
4 TABLET, ORALLY DISINTEGRATING ORAL EVERY 4 HOURS PRN
Qty: 10 TABLET | Refills: 0 | Status: SHIPPED | OUTPATIENT
Start: 2023-03-25

## 2023-03-25 RX ADMIN — OXYCODONE HYDROCHLORIDE 5 MG: 5 TABLET ORAL at 10:44

## 2023-03-25 RX ADMIN — OXYBUTYNIN CHLORIDE 5 MG: 5 TABLET ORAL at 11:33

## 2023-03-25 RX ADMIN — OXYCODONE HYDROCHLORIDE 10 MG: 10 TABLET ORAL at 13:49

## 2023-03-25 RX ADMIN — KETOROLAC TROMETHAMINE 30 MG: 30 INJECTION, SOLUTION INTRAMUSCULAR at 13:24

## 2023-03-25 RX ADMIN — PHENAZOPYRIDINE 200 MG: 200 TABLET ORAL at 10:17

## 2023-03-25 RX ADMIN — OXYCODONE HYDROCHLORIDE 5 MG: 5 TABLET ORAL at 01:39

## 2023-03-25 RX ADMIN — KETOROLAC TROMETHAMINE 15 MG: 30 INJECTION, SOLUTION INTRAMUSCULAR at 07:21

## 2023-03-25 RX ADMIN — KETOROLAC TROMETHAMINE 15 MG: 30 INJECTION, SOLUTION INTRAMUSCULAR at 00:11

## 2023-03-25 RX ADMIN — OXYCODONE HYDROCHLORIDE 5 MG: 5 TABLET ORAL at 05:39

## 2023-03-25 RX ADMIN — TAMSULOSIN HYDROCHLORIDE 0.4 MG: 0.4 CAPSULE ORAL at 10:16

## 2023-03-25 RX ADMIN — HYDROMORPHONE HYDROCHLORIDE 0.5 MG: 1 INJECTION, SOLUTION INTRAMUSCULAR; INTRAVENOUS; SUBCUTANEOUS at 08:20

## 2023-03-25 RX ADMIN — OXYCODONE HYDROCHLORIDE 5 MG: 5 TABLET ORAL at 10:20

## 2023-03-25 ASSESSMENT — LIFESTYLE VARIABLES
EVER FELT BAD OR GUILTY ABOUT YOUR DRINKING: NO
HAVE YOU EVER FELT YOU SHOULD CUT DOWN ON YOUR DRINKING: NO
ON A TYPICAL DAY WHEN YOU DRINK ALCOHOL HOW MANY DRINKS DO YOU HAVE: 0
DOES PATIENT WANT TO STOP DRINKING: NO
ALCOHOL_USE: NO
TOTAL SCORE: 0
TOTAL SCORE: 0
AVERAGE NUMBER OF DAYS PER WEEK YOU HAVE A DRINK CONTAINING ALCOHOL: 0
EVER HAD A DRINK FIRST THING IN THE MORNING TO STEADY YOUR NERVES TO GET RID OF A HANGOVER: NO
HOW MANY TIMES IN THE PAST YEAR HAVE YOU HAD 5 OR MORE DRINKS IN A DAY: 0
CONSUMPTION TOTAL: NEGATIVE
TOTAL SCORE: 0
HAVE PEOPLE ANNOYED YOU BY CRITICIZING YOUR DRINKING: NO

## 2023-03-25 ASSESSMENT — PAIN DESCRIPTION - PAIN TYPE
TYPE: SURGICAL PAIN
TYPE: ACUTE PAIN;SURGICAL PAIN
TYPE: SURGICAL PAIN

## 2023-03-25 ASSESSMENT — ENCOUNTER SYMPTOMS
FLANK PAIN: 1
FEVER: 0
NAUSEA: 0

## 2023-03-25 NOTE — ANESTHESIA TIME REPORT
Anesthesia Start and Stop Event Times     Date Time Event    3/24/2023 1621 Ready for Procedure     1625 Anesthesia Start     1715 Anesthesia Stop        Responsible Staff  03/24/23    Name Role Begin End    Crispin Dooley M.D. Anesth 1625 1715        Overtime Reason:  overtime    Comments:

## 2023-03-25 NOTE — ANESTHESIA POSTPROCEDURE EVALUATION
Patient: Pantera Connelly    Procedure Summary     Date: 03/24/23 Room / Location: [place] [address] / SURGERY [facility]    Anesthesia Start: 1625 Anesthesia Stop: 1707    Procedures:       CYSTOSCOPY (Bladder)      URETEROSCOPY (Left: Bladder)      LITHOTRIPSY, USING LASER (Left: Bladder) Diagnosis: (left ureteral stone)    Surgeons: Dustin Millard M.D. Responsible Provider: Crispin Dooley M.D.    Anesthesia Type: general ASA Status: 2          Final Anesthesia Type: general  Last vitals  BP   Blood Pressure: 132/69    Temp   36.2 °C (97.1 °F)    Pulse   77   Resp   18    SpO2   97 %      Anesthesia Post Evaluation    Patient location during evaluation: PACU  Patient participation: complete - patient participated  Level of consciousness: awake and alert    Airway patency: patent  Anesthetic complications: no  Cardiovascular status: hemodynamically stable  Respiratory status: acceptable  Hydration status: euvolemic    PONV: none          There were no known notable events for this encounter.     Nurse Pain Score: 2 (NPRS)

## 2023-03-25 NOTE — OR NURSING
Patient arrived from OR in bed. Report received from OR nurse and anesthesia. No belongings with patient. Pt is on 2L of oxygen. Wife given update. Report given to bedside RN.

## 2023-03-25 NOTE — CARE PLAN
The patient is Stable - Low risk of patient condition declining or worsening    Shift Goals  Clinical Goals: lithotripsy  Patient Goals: lithotripsy    Progress made toward(s) clinical / shift goals:  Pain managed per MAR      Problem: Pain - Standard  Goal: Alleviation of pain or a reduction in pain to the patient’s comfort goal  Outcome: Progressing       Patient is not progressing towards the following goals:

## 2023-03-25 NOTE — DISCHARGE SUMMARY
Discharge Summary    CHIEF COMPLAINT ON ADMISSION  Chief Complaint   Patient presents with    Flank Pain     LT flank pain x8 days. Dx'd with kidney stone x10 days ago, was at [facility] yesterday, was going to have a lithotripsy with a  stent but backed out.   Increased pain and nausea today. Reports a fever last night.        Reason for Admission  Flank Pain      Admission Date  3/24/2023    CODE STATUS  Full Code    HPI & HOSPITAL COURSE  This is a 32 y.o. male here with past medical history of anxiety presents with left flank pain related to a kidney stone.  There was some evidence of some mild obstructive changes on the left side.  Urology was consulted.  Patient taken the operating room patient did have very tight ureter.  Stent was able to be placed distally.  Patient given pain management with IV Toradol and p.o. oxycodone.  Patient started on Flomax oxybutynin and Pyridium for his ureteral spasms.  She was sent home to follow-up with urology in the next 1 to 2 weeks      Therefore, he is discharged in good and stable condition to home with close outpatient follow-up.    The patient recovered much more quickly than anticipated on admission.    Discharge Date  3/25    FOLLOW UP ITEMS POST DISCHARGE      DISCHARGE DIAGNOSES  Principal Problem:    Kidney stone POA: Yes  Active Problems:    Panic disorder POA: Yes    Obstructive uropathy POA: Yes  Resolved Problems:    * No resolved hospital problems. *      FOLLOW UP  No future appointments.  Dustin Millard M.D.  [address]  [facility] [address]  [phone]    Follow up  we will arrange stone surgery in 2-4 weeks      MEDICATIONS ON DISCHARGE     Medication List        START taking these medications        Instructions   ondansetron 4 MG Tbdp  Commonly known as: ZOFRAN ODT   Take 1 Tablet by mouth every four hours as needed for Nausea/Vomiting (give PO if no IV route available).  Dose: 4 mg     oxybutynin 5 MG Tabs  Commonly known as: DITROPAN   Take 1  Tablet by mouth 3 times a day for 10 days.  Dose: 5 mg     oxyCODONE immediate release 10 MG immediate release tablet  Commonly known as: ROXICODONE   Take 1 Tablet by mouth every 6 hours as needed for Severe Pain for up to 7 days.  Dose: 10 mg     phenazopyridine 200 MG Tabs  Commonly known as: PYRIDIUM   Take 1 Tablet by mouth 3 times a day with meals.  Dose: 200 mg            CONTINUE taking these medications        Instructions   ibuprofen 200 MG Tabs  Commonly known as: MOTRIN   Take 400 mg by mouth every 6 hours as needed (pain).  Dose: 400 mg     ketorolac 10 MG Tabs  Commonly known as: TORADOL   Take 1 Tablet by mouth every 6 hours as needed for Moderate Pain.  Dose: 10 mg     tamsulosin 0.4 MG capsule  Commonly known as: FLOMAX   Take 1 Capsule by mouth every evening.  Dose: 0.4 mg     vitamin D2 (Ergocalciferol) 1.25 MG (00996 UT) Caps capsule  Commonly known as: Drisdol   Take 50,000 Units by mouth every 7 days.  Dose: 50,000 Units            STOP taking these medications      acetaminophen 500 MG Tabs  Commonly known as: TYLENOL              Allergies  Allergies   Allergen Reactions    Other Drug Unspecified     'ANTIBIOTIC,NAME UNKNOWN,CAUSES RASH when he was 8 years old    [place] Unspecified     Unknown reaction         DIET  Orders Placed This Encounter   Procedures    Diet Order Diet: Regular     Standing Status:   Standing     Number of Occurrences:   1     Order Specific Question:   Diet:     Answer:   Regular [1]       ACTIVITY  As tolerated.  Weight bearing as tolerated    CONSULTATIONS  Dr fregoso urology    PROCEDURES  Date: 3/24/2023     Pre-operative diagnosis: Obstructing left ureteral stone     Post-operative diagnosis: Obstructing left ureteral stone     Procedures:  Cystoscopy, left ureteral stent placement     Surgeon: Surgeon(s) and Role:     * Dustin Fregoso M.D. - Primary     Anesthesia: General     EBL: Minimal     IV fluids: Per anesthesia.     Specimens: None     Complications:  None     Drains: 6 Malay by 26 cm stent left side    Disposition:  PACU, return to inpatient [facility]  Follow up in 2 to 4 weeks for follow-up ureteroscopy with lithotripsy.     Findings:  Tight distal left ureter, unable to advance scope.     Indications for procedure:     Pantera Connelly is a 32 y.o. male who presented with intractable pain secondary to a left ureteral stone.  After lengthy   discussion of risks and benefits, patient agreed to proceed with cystoscopy, left ureteroscopy with laser lithotripsy, left ureteral stent placement.     Details of procedure:     Patient was seen in the preoperative area and informed consent was obtained.  They were transported to the operating room and underwent general anesthesia without complication.  Ancef 2 g was administered for antibiotic prophylaxis.  Timeout was completed.  Patient was positioned in dorsal lithotomy and then prepped and draped.     22 Malay cystoscope was inserted into the bladder per urethra.  He was noted to have a tight meatus which required dilation with Percival sounds.  Cystoscopy was unremarkable.  Left ureteral orifice was identified and cannulated with a sensor wire which was advanced into the left kidney.  We then attempted to thread the digital scope over the wire, however were unable to bypass the distal ureter.  Linnette dilator was used to attempt to dilate the distal ureter but this had difficulty passing as well.  We once again attempted to pass the ureteroscope but met resistance at the same area.  We elected to place a stent for passive dilation.  Cystoscope was once again reinserted and under direct vision we placed a 6 Malay by 26 cm stent into the left kidney.  Good curl was noted on fluoroscopy.  Good curl was visualized in the bladder.  Bladder was emptied and scope was withdrawn.  Uro-Jet was administered.  This concluded the procedure.  Patient was awoken from anesthesia and transported to the PACU for recovery.                                 Routing History                    LABORATORY  Lab Results   Component Value Date    SODIUM 139 03/24/2023    POTASSIUM 4.2 03/24/2023    CHLORIDE 108 03/24/2023    CO2 20 03/24/2023    GLUCOSE 104 (H) 03/24/2023    BUN 10 03/24/2023    CREATININE 0.93 03/24/2023        Lab Results   Component Value Date    WBC 6.2 03/24/2023    HEMOGLOBIN 14.1 03/24/2023    HEMATOCRIT 40.4 (L) 03/24/2023    PLATELETCT 173 03/24/2023        Total time of the discharge process exceeds 37 minutes.

## 2023-03-25 NOTE — CARE PLAN
Problem: Pain - Standard  Goal: Alleviation of pain or a reduction in pain to the patient’s comfort goal  3/25/2023 1416 by Martha Moore RJL  Outcome: Met  3/25/2023 1056 by Martha Moore R.N.  Outcome: Progressing     Problem: Knowledge Deficit - Standard  Goal: Patient and family/care givers will demonstrate understanding of plan of care, disease process/condition, diagnostic tests and medications  Outcome: Met   The patient is Stable - Low risk of patient condition declining or worsening    Shift Goals  Clinical Goals: Pain control, diet toleration and ambulation  Patient Goals: pain control    Progress made toward(s) clinical / shift goals:  Yes, Pt tolerating diet, pain well control with Oxy 10mg, and was ambulating with steady gait around unit. Voiding well. Pt going home in good and stable conditions    Patient is not progressing towards the following goals:

## 2023-03-25 NOTE — PROGRESS NOTES
Report received from night shift RN. Assume care. Pt. AAOx4 pt is bed,  Assessment completed. VSS. Denies pain,  good CMS and pulses to jean-paul LE,  Pt was update for the care for the day. White board updated, All question answered. Pt has call light within reach,  bed is in the lowest position. Pt has no other needs at this time.    6560 [facility]

## 2023-03-25 NOTE — OP REPORT
Urology Operative Report    Date: 3/24/2023    Pre-operative diagnosis: Obstructing left ureteral stone    Post-operative diagnosis: Obstructing left ureteral stone    Procedures:  Cystoscopy, left ureteral stent placement    Surgeon: Surgeon(s) and Role:     * Dustin Millard M.D. - Primary    Anesthesia: General    EBL: Minimal    IV fluids: Per anesthesia.    Specimens: None    Complications: None    Drains: 6 [place] by 26 cm stent left side    Disposition:  PACU, return to inpatient [facility]  Follow up in 2 to 4 weeks for follow-up ureteroscopy with lithotripsy.    Findings:  Tight distal left ureter, unable to advance scope.    Indications for procedure:    Pantera Connelly is a 32 y.o. male who presented with intractable pain secondary to a left ureteral stone.  After lengthy   discussion of risks and benefits, patient agreed to proceed with cystoscopy, left ureteroscopy with laser lithotripsy, left ureteral stent placement.    Details of procedure:    Patient was seen in the preoperative area and informed consent was obtained.  They were transported to the operating room and underwent general anesthesia without complication.  Ancef 2 g was administered for antibiotic prophylaxis.  Timeout was completed.  Patient was positioned in dorsal lithotomy and then prepped and draped.    22 [place] cystoscope was inserted into the bladder per urethra.  He was noted to have a tight meatus which required dilation with [place] sounds.  Cystoscopy was unremarkable.  Left ureteral orifice was identified and cannulated with a sensor wire which was advanced into the left kidney.  We then attempted to thread the digital scope over the wire, however were unable to bypass the distal ureter.  Linnette dilator was used to attempt to dilate the distal ureter but this had difficulty passing as well.  We once again attempted to pass the ureteroscope but met resistance at the same area.  We elected to place a stent for passive  dilation.  Cystoscope was once again reinserted and under direct vision we placed a 6 [place] by 26 cm stent into the left kidney.  Good curl was noted on fluoroscopy.  Good curl was visualized in the bladder.  Bladder was emptied and scope was withdrawn.  Uro-Jet was administered.  This concluded the procedure.  Patient was awoken from anesthesia and transported to the PACU for recovery.

## 2023-03-25 NOTE — PROGRESS NOTES
Note to reader: this note follows the APSO format rather than the historical SOAP format. Assessment and plan located at the top of the note for ease of use.    Chief Complaint  32 y.o. year old male here with left ureteral stone    Assessment/Plan  Interval History   Left ureteral stone s/p ureteral stent placement due to tight ureter 3/24/23      Provide Flomax, Oxybutynin and Pyridium for stent bother  DC when stable  We will arrange left CULTS in 2-4 weeks  Urology signing off      Case discussed with patient and Urology-Dr. Kelvin DUMONT  Patient seen and examined    3/25. POD #1. Has stent bother with pains and frequency.           Review of Systems  Physical Exam   Review of Systems   Constitutional:  Negative for fever.   Gastrointestinal:  Negative for nausea.   Genitourinary:  Positive for flank pain, frequency and urgency.   Vitals:    03/24/23 2000 03/25/23 0400 03/25/23 0715 03/25/23 0800   BP: 113/64 115/56 116/61    Pulse: (!) 54 63 81 77   Resp: 12 16 16    Temp: 37.1 °C (98.8 °F) 36.7 °C (98 °F) 36.7 °C (98 °F)    TempSrc:  Temporal Temporal    SpO2: 97% 98% 93%    Weight:       Height:         Physical Exam  Vitals and nursing note reviewed.   Constitutional:       Appearance: Normal appearance.   Pulmonary:      Effort: Pulmonary effort is normal.   Neurological:      Mental Status: He is alert.        Hematology Chemistry   Lab Results   Component Value Date/Time    WBC 6.2 03/24/2023 12:45 PM    HEMOGLOBIN 14.1 03/24/2023 12:45 PM    HEMATOCRIT 40.4 (L) 03/24/2023 12:45 PM    PLATELETCT 173 03/24/2023 12:45 PM     Lab Results   Component Value Date/Time    SODIUM 139 03/24/2023 12:45 PM    POTASSIUM 4.2 03/24/2023 12:45 PM    CHLORIDE 108 03/24/2023 12:45 PM    CO2 20 03/24/2023 12:45 PM    GLUCOSE 104 (H) 03/24/2023 12:45 PM    BUN 10 03/24/2023 12:45 PM    CREATININE 0.93 03/24/2023 12:45 PM         Labs not explicitly included in this progress note were reviewed by the author.    Radiology/imaging not explicitly included in this progress note was reviewed by the author.     Radiology images reviewed, Labs reviewed and Medications reviewed

## 2023-03-25 NOTE — PROGRESS NOTES
Date of Service: 08/22/2017    REASON FOR VISIT:  Followup.      Addressed today are elevated blood sugar, hypertension, chronic pain.  This is a 55-year-old patient who presents here today for followup visit.  I reviewed her allergies, medication list and problem list, see Epic for details.    The patient continues to complain of chronic pain.  She has multiple sources of pain including her shoulders, her knees and her back.  She has been under care of multiple specialists for that including orthopedics and pain management.  Right now she tells me she has not taken anything for the pain.  She then goes on to tell me that she \"called the state\" and they told her that her primary care physician is obligated to prescribe pain medications to her.  We had another conversation today that I would not prescribe any narcotic pain medications due to her overall high risk and would suggest establishing with the pain management.  Of note, she was referred to pain management multiple times in the past and has not followed through.  She reports that Tylenol, Ibuprofen and Cyclobenzaprine do not provide any help.    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  She also requests TB testing.    We reviewed her recent blood work as obtained in May and notably she has blood sugar elevated at 211.  She was not aware of this finding.  She denies any polyuria, polydipsia or blurry vision.  She reports family history of diabetes.    REVIEW OF SYSTEMS:  She reports no chest pain or shortness of breath.  She denies any blurry vision or headaches.  She reports stable weight and appetite.    PHYSICAL EXAMINATION:  GENERAL:  She is alert and oriented x3, pleasant woman in no acute distress.  VITAL SIGNS:  Noted in Epic, stable.  LUNGS:  Clear to auscultation bilaterally.  CARDIOVASCULAR:  Normal S1, S2, regular rate and rhythm.  ABDOMEN:  Obese, benign  Patient discharged to home via personal vehicle. PIV removed by floor RN. Discharge instructions discussed with patient and family members, both voiced understanding.  Meds to beds and personal belongings sent with patient.    with no tenderness to palpation.  EXTREMITIES:  Without edema.  Feet are warm, well perfused.  She ambulates without difficulty.    A1c is obtained at this visit and is elevated at 8.4.    ASSESSMENT AND PLAN:  1.  Chronic pain.  The patient declined a refill of either Cyclobenzaprine or Ibuprofen.  She was provided with another referral to pain management.  2.  Elevated blood sugar, new diagnosis of diabetes.  This was discussed at length with the patient.  Clearly she can now be diagnosed with diabetes.  We discussed the implications of this diagnosis as well as management.  She is agreeable to start Metformin.  Risks, benefits and side effects discussed.  She was also referred to diabetic education.  3.  Chronic cough.  She continues to see her allergy specialist.  Still reports chronic cough, though I have not been able to observe any at the visit today.  Management as per allergy specialist.  4.  She can volunteer with the children and paperwork was completed accordingly.  She had QuantiFERON testing done on 03/20/2017, which was negative and patient was provided with that paperwork as well.    Return visit with me in 3 months for continued diabetes followup.  Will also start diabetes evaluation accordingly, microalbumin will be checked today.      Dictated By: Yoli Monique MD  Signing Provider: MD JOSE LUIS Brooks/marianela ([id number])  DD: 08/22/2017 16:40:19 TD: 08/23/2017 11:04:02    Copy Sent To:

## 2023-03-25 NOTE — CARE PLAN
Problem: Pain - Standard  Goal: Alleviation of pain or a reduction in pain to the patient’s comfort goal  Outcome: Progressing   The patient is Stable - Low risk of patient condition declining or worsening    Shift Goals  Clinical Goals: Pain control, diet toleration and ambulation  Patient Goals: pain control    Progress made toward(s) clinical / shift goals:  Yes, Dilaudid given to breakthrough pain, Oxy up to 10 mg and Toradol up to 30 mg as well    Patient is not progressing towards the following goals:
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
## 2023-04-18 NOTE — DISCHARGE SUMMARY
Discharge Summary    CHIEF COMPLAINT ON ADMISSION  Chief Complaint   Patient presents with    Flank Pain     L flank pain- dx with kidney stone approx 12 days ago. Reports pain is worsening and also experiencing chills and nausea.       Reason for Admission  side pain;back pain;nausea     Admission Date  3/23/2023    CODE STATUS  Full Code    HPI & HOSPITAL COURSE  Pantera Connelly is a 32 y.o. male who presents with left flank pain.  Pain started about 12 days ago.  Initially had pain with urination and hematuria.  Hematuria and dysuria has resolved but patient has persistent left flank pain which is now severe.  He also endorsed some chills.  He has not had fever.  He feels nauseous but has not been vomiting.  Normal bowel movements.  Also reports being athletic which would explain his bradycardia.     In the ED labs were benign for signs of infection.  Of note his liver enzymes were elevated with a 2:1 hepatocellular pattern.  But this is very mild.  Alk phos and total bili were also very mildly elevated.     X-ray of abdomen identified 4 x 6 mm ureteral calculus.  Urology was consulted and Dr. Warren recommended lithotripsy today.  Patient was admitted and was made n.p.o. however upon arrival in preop patient ultimately decided to leave AMA and pursue outpatient follow up for lithotripsy.     Therefore, he is discharged in fair and stable condition against medcial advice.    The patient recovered much more quickly than anticipated on admission.    Discharge Date  3/23/2023    FOLLOW UP ITEMS POST DISCHARGE  Follow-up with urology [place]    DISCHARGE DIAGNOSES  Principal Problem:    Kidney stone POA: Yes  Active Problems:    Fatty liver POA: Unknown  Resolved Problems:    * No resolved hospital problems. *      FOLLOW UP  Advised to follow-up with outpatient urology    MEDICATIONS ON DISCHARGE     Medication List        START taking these medications        Instructions   ketorolac 10 MG Tabs  Commonly  known as: TORADOL   Take 1 Tablet by mouth every 6 hours as needed for Moderate Pain.  Dose: 10 mg            CONTINUE taking these medications        Instructions   acetaminophen 500 MG Tabs  Commonly known as: TYLENOL   Take 500-1,000 mg by mouth every 6 hours as needed.  Dose: 500-1,000 mg     ibuprofen 200 MG Tabs  Commonly known as: MOTRIN   Take 400 mg by mouth every 6 hours as needed (pain).  Dose: 400 mg     tamsulosin 0.4 MG capsule  Commonly known as: FLOMAX   Take 1 Capsule by mouth every evening.  Dose: 0.4 mg     vitamin D2 (Ergocalciferol) 1.25 MG (57370 UT) Caps capsule  Commonly known as: Drisdol   Take 50,000 Units by mouth every 7 days.  Dose: 50,000 Units              Allergies  Allergies   Allergen Reactions    Other Drug Unspecified     'ANTIBIOTIC,NAME UNKNOWN,CAUSES RASH when he was 8 years old    [place] Unspecified     Unknown reaction         DIET  Orders Placed This Encounter   Procedures    Diet NPO Restrict to: Sips with Medications     Standing Status:   Standing     Number of Occurrences:   1     Order Specific Question:   Diet NPO Restrict to:     Answer:   Sips with Medications [3]       ACTIVITY  As tolerated.  Weight bearing as tolerated    CONSULTATIONS  Urology    PROCEDURES  None    LABORATORY  Lab Results   Component Value Date    SODIUM 135 03/23/2023    POTASSIUM 4.2 03/23/2023    CHLORIDE 101 03/23/2023    CO2 22 03/23/2023    GLUCOSE 112 (H) 03/23/2023    BUN 9 03/23/2023    CREATININE 0.98 03/23/2023        Lab Results   Component Value Date    WBC 5.9 03/23/2023    HEMOGLOBIN 14.5 03/23/2023    HEMATOCRIT 41.8 (L) 03/23/2023    PLATELETCT 179 03/23/2023        Total time of the discharge process was 31 minutes.

## 2025-04-22 ENCOUNTER — APPOINTMENT (OUTPATIENT)
Dept: RADIOLOGY | Facility: IMAGING CENTER | Age: 35
End: 2025-04-22
Attending: NURSE PRACTITIONER
Payer: COMMERCIAL

## 2025-04-22 ENCOUNTER — OFFICE VISIT (OUTPATIENT)
Dept: URGENT CARE | Facility: CLINIC | Age: 35
End: 2025-04-22
Payer: COMMERCIAL

## 2025-04-22 VITALS
WEIGHT: 218 LBS | RESPIRATION RATE: 18 BRPM | BODY MASS INDEX: 26.55 KG/M2 | DIASTOLIC BLOOD PRESSURE: 86 MMHG | TEMPERATURE: 98.1 F | HEIGHT: 76 IN | SYSTOLIC BLOOD PRESSURE: 116 MMHG | HEART RATE: 86 BPM | OXYGEN SATURATION: 98 %

## 2025-04-22 DIAGNOSIS — S86.912A MUSCLE STRAIN OF LEFT LOWER LEG, INITIAL ENCOUNTER: ICD-10-CM

## 2025-04-22 DIAGNOSIS — M79.662 PAIN IN LEFT LOWER LEG: ICD-10-CM

## 2025-04-22 DIAGNOSIS — M25.572 LEFT ANKLE PAIN, UNSPECIFIED CHRONICITY: ICD-10-CM

## 2025-04-22 PROCEDURE — 3074F SYST BP LT 130 MM HG: CPT | Performed by: NURSE PRACTITIONER

## 2025-04-22 PROCEDURE — 73610 X-RAY EXAM OF ANKLE: CPT | Mod: TC,LT | Performed by: RADIOLOGY

## 2025-04-22 PROCEDURE — 3079F DIAST BP 80-89 MM HG: CPT | Performed by: NURSE PRACTITIONER

## 2025-04-22 PROCEDURE — 99213 OFFICE O/P EST LOW 20 MIN: CPT | Performed by: NURSE PRACTITIONER

## 2025-04-22 ASSESSMENT — VISUAL ACUITY: OU: 1

## 2025-04-22 ASSESSMENT — ENCOUNTER SYMPTOMS: INABILITY TO BEAR WEIGHT: 0

## 2025-04-22 NOTE — PROGRESS NOTES
Subjective:     Pantera Connelly is a 34 y.o. male who presents for Ankle Injury (X3mo, ankle injury while snowboarding, pain is getting worse)       Ankle Injury   The injury mechanism was a twisting injury. The pain is present in the left leg and left ankle. Pertinent negatives include no inability to bear weight.     Ambient listening software ([employer]) used during this encounter with the consent of the patient. The following text is AI-assisted:    History of Present Illness  The patient presents for evaluation of ankle pain.    Ankle Pain  Reported minor ankle injury with history of previous sprain and fracture. Current pain is constant with intermittent severe aching [distal lateral left lower leg proximal to ankle]. Injury occurred 2 months ago during snowboarding, involving a twisting motion [was attempting tail butters on powder, then fell to his side while still strapped in, boots looser than usual]. Occasional swelling noted, non-tender. Pain exacerbated by walking, deep squatting, prolonged dorsiflexion; alleviated by movement. Norovirus contraction caused severe pain when leaning forward to vomit. Stretching induces pain.  - Onset: Injury occurred 2 months ago during snowboarding.  - Location: Ankle.  - Duration: Constant pain with intermittent severe aching.  - Character: Occasional swelling, non-tender.  - Alleviating/Aggravating Factors: Pain exacerbated by walking, deep squatting, prolonged dorsiflexion; alleviated by movement. Severe pain when leaning forward to vomit due to Norovirus contraction. Stretching induces pain.  - Severity: Intermittent severe aching.    Review of Systems   Musculoskeletal:         Per HPI   All other systems reviewed and are negative.  Refer to HPI for additional details.    During this visit, appropriate PPE was worn, and hand hygiene was performed.    PMH:  has a past medical history of Heart burn, Indigestion, and Pneumonia.    MEDS: No current outpatient  "medications on file.    ALLERGIES:   Allergies   Allergen Reactions    Pork Allergy     Other Drug Unspecified     'ANTIBIOTIC,NAME UNKNOWN,CAUSES RASH when he was 8 years old    [place] Unspecified     Unknown reaction       SURGHX:   Past Surgical History:   Procedure Laterality Date    CA CYSTOURETHROSCOPY N/A 3/24/2023    Procedure: CYSTOSCOPY;  Surgeon: Dustin Millard M.D.;  Location: SURGERY [facility];  Service: Urology    CA CYSTO/URETERO/PYELOSCOPY, DX Left 3/24/2023    Procedure: URETEROSCOPY, left stent placement;  Surgeon: Dustin Millard M.D.;  Location: SURGERY [facility];  Service: Urology    SEPTOPLASTY  8/13/2012    [facility]    TURBINOPLASTY  8/13/2012    Performed by IJEOMA SMART [facility]    NASAL FRACTURE REDUCTION OPEN  8/13/2012    Performed by SHAJI POSADAS at [facility]    OTHER      WISDOM TEETH REMOVED     SOCHX:  reports that he has never smoked. He has never used smokeless tobacco. He reports that he does not currently use alcohol. He reports that he does not use drugs.    FH: Per HPI as applicable/pertinent.      Objective:     /86 (BP Location: Left arm, Patient Position: Sitting, BP Cuff Size: Adult long)   Pulse 86   Temp 36.7 °C (98.1 °F) (Temporal)   Resp 18   Ht 1.93 m (6' 4\")   Wt 98.9 kg (218 lb)   SpO2 98%   BMI 26.54 kg/m²     Physical Exam  Nursing note reviewed.   Constitutional:       General: He is not in acute distress.     Appearance: He is well-developed. He is not ill-appearing or toxic-appearing.   Eyes:      General: Vision grossly intact.   Cardiovascular:      Rate and Rhythm: Normal rate.      Pulses: Normal pulses.   Pulmonary:      Effort: Pulmonary effort is normal. No respiratory distress.   Musculoskeletal:         General: No deformity. Normal range of motion.      Left lower leg: No swelling, deformity, lacerations or tenderness. "      Left ankle: No swelling, deformity, ecchymosis or lacerations. No tenderness. Normal range of motion. Anterior drawer test negative.      Left foot: Normal range of motion and normal capillary refill. No swelling, deformity, laceration or tenderness. Normal pulse.        Feet:       Comments: Increased pain at distal posterolateral left lower leg with dorsiflexion, otherwise no TTP or swelling   Skin:     General: Skin is warm and dry.      Capillary Refill: Capillary refill takes less than 2 seconds.      Coloration: Skin is not pale.      Findings: No bruising, erythema, lesion or rash.   Neurological:      Mental Status: He is alert and oriented to person, place, and time.      Motor: No weakness.   Psychiatric:         Behavior: Behavior normal. Behavior is cooperative.     XR L Ankle:    Details    Reading Physician Reading Date Result Priority   Arvin Smtih M.D.  [phone]     4/22/2025      Narrative & Impression     4/22/2025 1:18 PM     HISTORY/REASON FOR EXAM:  Pain/Deformity Following Trauma     TECHNIQUE/EXAM DESCRIPTION AND NUMBER OF VIEWS:  3 views of the LEFT ankle.     COMPARISON: None     FINDINGS:  The alignment of the ankle is normal.  There is no focal swelling.  There is no evidence of displaced fracture or dislocation.    IMPRESSION:     Normal ankle series.        Exam Ended: 04/22/25  1:33 PM Last Resulted: 04/22/25  1:34 PM     Radiology report and images reviewed by myself.       Assessment/Plan:     1. Pain in left lower leg  - DX-ANKLE 3+ VIEWS LEFT; Future  - Referral to Sports Medicine    2. Left ankle pain, unspecified chronicity  - DX-ANKLE 3+ VIEWS LEFT; Future  - Referral to Sports Medicine    3. Muscle strain of left lower leg, initial encounter  - Referral to Sports Medicine    Rest, ice, compression, and elevation (RICE) and over-the-counter acetaminophen alternating with ibuprofen, per 's instructions, as needed for pain.     Recommend follow up with  [facility] located at [address], [place] [address]. Make an appointment by going on [employer] or calling [phone]. Referral also placed.    Monitor. Warning signs reviewed. Return precautions advised.    Discharge summary provided.     Differential diagnosis, natural history, supportive care, over-the-counter symptom management per 's instructions, close monitoring, and indications for immediate follow-up discussed.     All questions answered. Patient agrees with the plan of care.

## 2025-04-22 NOTE — PATIENT INSTRUCTIONS
Details    Reading Physician Reading Date Result Priority   Arvin Smith M.D.  [phone]     4/22/2025      Narrative & Impression     4/22/2025 1:18 PM     HISTORY/REASON FOR EXAM:  Pain/Deformity Following Trauma        TECHNIQUE/EXAM DESCRIPTION AND NUMBER OF VIEWS:  3 views of the LEFT ankle.     COMPARISON: None     FINDINGS:  The alignment of the ankle is normal.  There is no focal swelling.  There is no evidence of displaced fracture or dislocation.           IMPRESSION:     Normal ankle series.        Exam Ended: 04/22/25  1:33 PM Last Resulted: 04/22/25  1:34 PM     Recommend follow up with [facility] located at [facility] [address]. Make an appointment by going on [employer] or calling [phone]. Referral also placed.

## 2025-04-29 NOTE — Clinical Note
REFERRAL APPROVAL NOTICE         Sent on April 29, 2025                   Pantera Connelly  [address]  [place] [address]                   Dear Mr. Connelly,    After a careful review of the medical information and benefit coverage, [facility] has processed your referral. See below for additional details.    If applicable, you must be actively enrolled with your insurance for coverage of the authorized service. If you have any questions regarding your coverage, please contact your insurance directly.    REFERRAL INFORMATION   Referral #:  [id number]  Referred-To Department    Referred-By Provider:  Sports Medicine    ALEX Perkins   [address]      [address]  [address]  [place] [id number] [phone] [address]  [place] [id number] [phone]    Referral Start Date:  04/22/2025  Referral End Date:   04/22/2026             SCHEDULING  If you do not already have an appointment, please call [phone] to make an appointment.     MORE INFORMATION  If you do not already have a [facility] account, sign up at: [email or web address]  You can access your medical information, make appointments, see lab results, billing information, and more.  If you have questions regarding this referral, please contact  the [facility] Referrals department at:             [phone]. Monday - Friday 8:00AM - 5:00PM.     Sincerely,    [facility]

## 2025-04-30 ENCOUNTER — TELEPHONE (OUTPATIENT)
Dept: SCHEDULING | Facility: IMAGING CENTER | Age: 35
End: 2025-04-30
Payer: COMMERCIAL

## 2025-05-07 ENCOUNTER — APPOINTMENT (OUTPATIENT)
Dept: RADIOLOGY | Facility: MEDICAL CENTER | Age: 35
End: 2025-05-07
Attending: FAMILY MEDICINE
Payer: COMMERCIAL

## 2025-05-07 DIAGNOSIS — N20.0 CALCULUS OF KIDNEY: ICD-10-CM

## 2025-05-07 PROCEDURE — 76775 US EXAM ABDO BACK WALL LIM: CPT

## (undated) DEVICE — LACTATED RINGERS INJ 1000 ML - (14EA/CA 60CA/PF)

## (undated) DEVICE — TUBING CLEARLINK DUO-VENT - C-FLO (48EA/CA)

## (undated) DEVICE — CANISTER SUCTION RIGID RED 1500CC (40EA/CA)

## (undated) DEVICE — SLEEVE VASO CALF MED - (10PR/CA)

## (undated) DEVICE — WATER IRRIGATION STERILE 1000ML (12EA/CA)

## (undated) DEVICE — GLOVE BIOGEL INDICATOR SZ 7.5 SURGICAL PF LTX - (50PR/BX 4BX/CA)

## (undated) DEVICE — SET EXTENSION WITH 2 PORTS (48EA/CA) ***PART #2C8610 IS A SUBSTITUTE*****

## (undated) DEVICE — DRAPE TABLE UROLOGY DRAINAGE (20EA/BX)

## (undated) DEVICE — COVER LIGHT HANDLE ALC PLUS DISP (18EA/BX)

## (undated) DEVICE — SENSOR OXIMETER ADULT SPO2 RD SET (20EA/BX)

## (undated) DEVICE — COVER FOOT UNIVERSAL DISP. - (25EA/CA)

## (undated) DEVICE — PACK CYSTOSCOPY III - (8/CA)

## (undated) DEVICE — SPONGE GAUZESTER 4 X 4 4PLY - (128PK/CA)

## (undated) DEVICE — GOWN WARMING STANDARD FLEX - (30/CA)

## (undated) DEVICE — SET LEADWIRE 5 LEAD BEDSIDE DISPOSABLE ECG (1SET OF 5/EA)

## (undated) DEVICE — CONTAINER SPECIMEN BAG OR - STERILE 4 OZ W/LID (100EA/CA)

## (undated) DEVICE — GLOVE SZ 7.5 BIOGEL PI MICRO - PF LF (50PR/BX)

## (undated) DEVICE — HUMID-VENT HEAT AND MOISTURE EXCHANGE- (50/BX)

## (undated) DEVICE — WIRE GUIDE SENSOR DUAL FLEX - 5/BX

## (undated) DEVICE — SUCTION INSTRUMENT YANKAUER BULBOUS TIP W/O VENT (50EA/CA)

## (undated) DEVICE — PACK CYSTO III (2EA/CA)

## (undated) DEVICE — BAG DRAINAGE LINGEMAN CYSTO FOR GE/OEC 2600/2800 TABLES (20EA/CA)

## (undated) DEVICE — CONNECTOR HOSE NEPTUNE FOR CYSTO ROOM

## (undated) DEVICE — CANISTER SUCTION 3000ML MECHANICAL FILTER AUTO SHUTOFF MEDI-VAC NONSTERILE LF DISP  (40EA/CA)

## (undated) DEVICE — SODIUM CHL. IRRIGATION 0.9% 3000ML (4EA/CA 65CA/PF)

## (undated) DEVICE — SODIUM CHL IRRIGATION 0.9% 1000ML (12EA/CA)

## (undated) DEVICE — GLOVE BIOGEL SZ 7.5 SURGICAL PF LTX - (50PR/BX 4BX/CA)

## (undated) DEVICE — TUBE CONNECTING SUCTION - CLEAR PLASTIC STERILE 72 IN (50EA/CA)

## (undated) DEVICE — BAG SPONGE COUNT 10.25 X 32 - BLUE (250/CA)

## (undated) DEVICE — DILATOR NOTTINGHAM 6F-10FRX70CM

## (undated) DEVICE — WATER IRRIG. STER 3000 ML - (4/CA)

## (undated) DEVICE — TOWEL STOP TIMEOUT SAFETY FLAG (40EA/CA)

## (undated) DEVICE — ELECTRODE DUAL RETURN W/ CORD - (50/PK)